# Patient Record
Sex: MALE | Race: WHITE
[De-identification: names, ages, dates, MRNs, and addresses within clinical notes are randomized per-mention and may not be internally consistent; named-entity substitution may affect disease eponyms.]

---

## 2019-07-30 ENCOUNTER — HOSPITAL ENCOUNTER (EMERGENCY)
Dept: HOSPITAL 83 - ED | Age: 84
Discharge: HOME | End: 2019-07-30
Payer: MEDICARE

## 2019-07-30 VITALS — HEIGHT: 60 IN | WEIGHT: 225 LBS

## 2019-07-30 DIAGNOSIS — T63.441A: Primary | ICD-10-CM
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
## 2019-07-30 DIAGNOSIS — Z79.899: ICD-10-CM

## 2019-07-30 DIAGNOSIS — Z79.82: ICD-10-CM

## 2020-03-20 ENCOUNTER — HOSPITAL ENCOUNTER (INPATIENT)
Dept: HOSPITAL 83 - ED | Age: 85
LOS: 2 days | Discharge: HOME | DRG: 871 | End: 2020-03-22
Attending: EMERGENCY MEDICINE | Admitting: EMERGENCY MEDICINE
Payer: MEDICARE

## 2020-03-20 VITALS — DIASTOLIC BLOOD PRESSURE: 72 MMHG | SYSTOLIC BLOOD PRESSURE: 136 MMHG

## 2020-03-20 VITALS — WEIGHT: 212.19 LBS | HEIGHT: 70 IN | BODY MASS INDEX: 30.38 KG/M2

## 2020-03-20 VITALS — DIASTOLIC BLOOD PRESSURE: 73 MMHG

## 2020-03-20 VITALS — SYSTOLIC BLOOD PRESSURE: 154 MMHG | DIASTOLIC BLOOD PRESSURE: 78 MMHG

## 2020-03-20 VITALS — DIASTOLIC BLOOD PRESSURE: 105 MMHG | SYSTOLIC BLOOD PRESSURE: 170 MMHG

## 2020-03-20 DIAGNOSIS — Z96.651: ICD-10-CM

## 2020-03-20 DIAGNOSIS — Z79.899: ICD-10-CM

## 2020-03-20 DIAGNOSIS — N17.0: ICD-10-CM

## 2020-03-20 DIAGNOSIS — I10: ICD-10-CM

## 2020-03-20 DIAGNOSIS — E86.0: ICD-10-CM

## 2020-03-20 DIAGNOSIS — I24.8: ICD-10-CM

## 2020-03-20 DIAGNOSIS — E11.65: ICD-10-CM

## 2020-03-20 DIAGNOSIS — E87.2: ICD-10-CM

## 2020-03-20 DIAGNOSIS — R65.20: ICD-10-CM

## 2020-03-20 DIAGNOSIS — Z79.82: ICD-10-CM

## 2020-03-20 DIAGNOSIS — I47.1: ICD-10-CM

## 2020-03-20 DIAGNOSIS — R74.0: ICD-10-CM

## 2020-03-20 DIAGNOSIS — D53.9: ICD-10-CM

## 2020-03-20 DIAGNOSIS — S26.90XA: ICD-10-CM

## 2020-03-20 DIAGNOSIS — R68.89: ICD-10-CM

## 2020-03-20 DIAGNOSIS — A41.9: Primary | ICD-10-CM

## 2020-03-20 DIAGNOSIS — H91.90: ICD-10-CM

## 2020-03-20 DIAGNOSIS — E44.1: ICD-10-CM

## 2020-03-20 DIAGNOSIS — E87.1: ICD-10-CM

## 2020-03-20 DIAGNOSIS — J18.9: ICD-10-CM

## 2020-03-20 LAB
ALBUMIN SERPL-MCNC: 3.9 GM/DL (ref 3.1–4.5)
ALP SERPL-CCNC: 53 U/L (ref 45–117)
ALT SERPL W P-5'-P-CCNC: 32 U/L (ref 12–78)
APPEARANCE UR: (no result)
APTT PPP: 25.2 SECONDS (ref 20–32.1)
AST SERPL-CCNC: 47 IU/L (ref 3–35)
BACTERIA #/AREA URNS HPF: (no result) /[HPF]
BASOPHILS # BLD AUTO: 0 10*3/UL (ref 0–0.1)
BASOPHILS NFR BLD AUTO: 0.1 % (ref 0–1)
BILIRUB UR QL STRIP: NEGATIVE
BUN SERPL-MCNC: 27 MG/DL (ref 7–24)
CHLORIDE SERPL-SCNC: 102 MMOL/L (ref 98–107)
COLOR UR: YELLOW
CREAT SERPL-MCNC: 1.4 MG/DL (ref 0.7–1.3)
EOSINOPHIL # BLD AUTO: 0 10*3/UL (ref 0–0.4)
EOSINOPHIL # BLD AUTO: 0.1 % (ref 1–4)
EPI CELLS #/AREA URNS HPF: (no result) /[HPF]
ERYTHROCYTE [DISTWIDTH] IN BLOOD BY AUTOMATED COUNT: 12.8 % (ref 0–14.5)
GLUCOSE UR QL: (no result)
HCT VFR BLD AUTO: 44 % (ref 42–52)
HGB BLD-MCNC: 14.8 G/DL (ref 14–18)
HGB UR QL STRIP: (no result)
INR BLD: 1 (ref 2–3.5)
KETONES UR QL STRIP: (no result)
LDH SERPL-CCNC: 303 U/L (ref 87–241)
LEUKOCYTE ESTERASE UR QL STRIP: NEGATIVE
LYMPHOCYTES # BLD AUTO: 1 10*3/UL (ref 1.3–4.4)
LYMPHOCYTES NFR BLD AUTO: 10 % (ref 27–41)
MCH RBC QN AUTO: 33.5 PG (ref 27–31)
MCHC RBC AUTO-ENTMCNC: 33.6 G/DL (ref 33–37)
MCV RBC AUTO: 99.5 FL (ref 80–94)
MONOCYTES # BLD AUTO: 0.5 10*3/UL (ref 0.1–1)
MONOCYTES NFR BLD MANUAL: 5.5 % (ref 3–9)
MUCOUS THREADS URNS QL MICRO: (no result)
NEUT #: 8 10*3/UL (ref 2.3–7.9)
NEUT %: 84 % (ref 47–73)
NITRITE UR QL STRIP: NEGATIVE
NRBC BLD QL AUTO: 0 10*3/UL (ref 0–0)
PH UR STRIP: 6 [PH] (ref 5–9)
PLATELET # BLD AUTO: 194 10*3/UL (ref 130–400)
PMV BLD AUTO: 10.6 FL (ref 9.6–12.3)
POTASSIUM SERPL-SCNC: 4.8 MMOL/L (ref 3.5–5.1)
PROT SERPL-MCNC: 7.6 GM/DL (ref 6.4–8.2)
RBC # BLD AUTO: 4.42 10*6/UL (ref 4.5–5.9)
RBC #/AREA URNS HPF: (no result) RBC/HPF (ref 0–2)
SODIUM SERPL-SCNC: 133 MMOL/L (ref 136–145)
SP GR UR: 1.02 (ref 1–1.03)
TROPONIN I SERPL-MCNC: 0.48 NG/ML (ref ?–0.04)
TSH SERPL DL<=0.005 MIU/L-ACNC: 3.9 UIU/ML (ref 0.36–4.75)
UROBILINOGEN UR STRIP-MCNC: 0.2 E.U./DL (ref 0.2–1)
WBC #/AREA URNS HPF: (no result) WBC/HPF (ref 0–5)
WBC NRBC COR # BLD AUTO: 9.6 10*3/UL (ref 4.8–10.8)

## 2020-03-21 VITALS — DIASTOLIC BLOOD PRESSURE: 56 MMHG

## 2020-03-21 VITALS — DIASTOLIC BLOOD PRESSURE: 68 MMHG | SYSTOLIC BLOOD PRESSURE: 146 MMHG

## 2020-03-21 VITALS — DIASTOLIC BLOOD PRESSURE: 78 MMHG

## 2020-03-21 VITALS — DIASTOLIC BLOOD PRESSURE: 58 MMHG

## 2020-03-21 LAB
25(OH)D3 SERPL-MCNC: 31.4 NG/ML (ref 30–100)
ALBUMIN SERPL-MCNC: 3.3 GM/DL (ref 3.1–4.5)
ALP SERPL-CCNC: 43 U/L (ref 45–117)
ALT SERPL W P-5'-P-CCNC: 25 U/L (ref 12–78)
AST SERPL-CCNC: 35 IU/L (ref 3–35)
BASOPHILS # BLD AUTO: 0 10*3/UL (ref 0–0.1)
BASOPHILS NFR BLD AUTO: 0.3 % (ref 0–1)
BUN SERPL-MCNC: 18 MG/DL (ref 7–24)
CHLORIDE SERPL-SCNC: 109 MMOL/L (ref 98–107)
CHOLEST SERPL-MCNC: 118 MG/DL (ref ?–200)
CREAT SERPL-MCNC: 0.88 MG/DL (ref 0.7–1.3)
EOSINOPHIL # BLD AUTO: 0.1 10*3/UL (ref 0–0.4)
EOSINOPHIL # BLD AUTO: 1.2 % (ref 1–4)
ERYTHROCYTE [DISTWIDTH] IN BLOOD BY AUTOMATED COUNT: 12.9 % (ref 0–14.5)
HCT VFR BLD AUTO: 37.6 % (ref 42–52)
HDLC SERPL-MCNC: 37 MG/DL (ref 40–60)
HGB BLD-MCNC: 12.5 G/DL (ref 14–18)
INR BLD: 1 (ref 2–3.5)
LDH SERPL-CCNC: 201 U/L (ref 87–241)
LDLC SERPL DIRECT ASSAY-MCNC: 56 MG/DL (ref 9–159)
LYMPHOCYTES # BLD AUTO: 2.1 10*3/UL (ref 1.3–4.4)
LYMPHOCYTES NFR BLD AUTO: 26.5 % (ref 27–41)
MCH RBC QN AUTO: 33 PG (ref 27–31)
MCHC RBC AUTO-ENTMCNC: 33.2 G/DL (ref 33–37)
MCV RBC AUTO: 99.2 FL (ref 80–94)
MONOCYTES # BLD AUTO: 0.8 10*3/UL (ref 0.1–1)
MONOCYTES NFR BLD MANUAL: 9.9 % (ref 3–9)
NEUT #: 4.8 10*3/UL (ref 2.3–7.9)
NEUT %: 61.8 % (ref 47–73)
NRBC BLD QL AUTO: 0 10*3/UL (ref 0–0)
PHOSPHATE SERPL-MCNC: 2.7 MG/DL (ref 2.5–4.9)
PLATELET # BLD AUTO: 150 10*3/UL (ref 130–400)
PMV BLD AUTO: 10.3 FL (ref 9.6–12.3)
POTASSIUM SERPL-SCNC: 3.9 MMOL/L (ref 3.5–5.1)
PROT SERPL-MCNC: 6.4 GM/DL (ref 6.4–8.2)
RBC # BLD AUTO: 3.79 10*6/UL (ref 4.5–5.9)
SODIUM SERPL-SCNC: 140 MMOL/L (ref 136–145)
TRIGL SERPL-MCNC: 126 MG/DL (ref ?–150)
TROPONIN I SERPL-MCNC: 0.26 NG/ML (ref ?–0.04)
TSH SERPL DL<=0.005 MIU/L-ACNC: 5.24 UIU/ML (ref 0.36–4.75)
VITAMIN B12: 304 PG/ML (ref 247–911)
VLDLC SERPL CALC-MCNC: 25 MG/DL (ref 6–40)
WBC NRBC COR # BLD AUTO: 7.7 10*3/UL (ref 4.8–10.8)

## 2020-03-22 VITALS — SYSTOLIC BLOOD PRESSURE: 149 MMHG | DIASTOLIC BLOOD PRESSURE: 67 MMHG

## 2020-03-22 VITALS — SYSTOLIC BLOOD PRESSURE: 162 MMHG | DIASTOLIC BLOOD PRESSURE: 77 MMHG

## 2020-05-16 ENCOUNTER — HOSPITAL ENCOUNTER (OUTPATIENT)
Dept: HOSPITAL 83 - LAB | Age: 85
Discharge: HOME | End: 2020-05-16
Payer: MEDICARE

## 2020-05-16 DIAGNOSIS — R60.9: ICD-10-CM

## 2020-05-16 DIAGNOSIS — E11.65: Primary | ICD-10-CM

## 2020-05-16 DIAGNOSIS — I51.7: ICD-10-CM

## 2020-05-16 DIAGNOSIS — R19.7: ICD-10-CM

## 2020-05-16 LAB
ALBUMIN SERPL-MCNC: 3.4 GM/DL (ref 3.1–4.5)
ALP SERPL-CCNC: 59 U/L (ref 45–117)
ALT SERPL W P-5'-P-CCNC: 58 U/L (ref 12–78)
AST SERPL-CCNC: 66 IU/L (ref 3–35)
BUN SERPL-MCNC: 27 MG/DL (ref 7–24)
CHLORIDE SERPL-SCNC: 106 MMOL/L (ref 98–107)
CREAT SERPL-MCNC: 1.13 MG/DL (ref 0.7–1.3)
POTASSIUM SERPL-SCNC: 4.2 MMOL/L (ref 3.5–5.1)
PROT SERPL-MCNC: 6.9 GM/DL (ref 6.4–8.2)
SODIUM SERPL-SCNC: 137 MMOL/L (ref 136–145)

## 2020-05-20 ENCOUNTER — HOSPITAL ENCOUNTER (OUTPATIENT)
Dept: HOSPITAL 83 - CARD | Age: 85
Discharge: HOME | End: 2020-05-20
Payer: MEDICARE

## 2020-05-20 DIAGNOSIS — I08.1: Primary | ICD-10-CM

## 2020-05-23 ENCOUNTER — HOSPITAL ENCOUNTER (EMERGENCY)
Dept: HOSPITAL 83 - ED | Age: 85
Discharge: HOME | End: 2020-05-23
Payer: MEDICARE

## 2020-05-23 VITALS — HEIGHT: 70 IN | WEIGHT: 210 LBS | BODY MASS INDEX: 30.06 KG/M2

## 2020-05-23 DIAGNOSIS — R53.83: Primary | ICD-10-CM

## 2020-05-23 DIAGNOSIS — Z79.899: ICD-10-CM

## 2020-05-23 DIAGNOSIS — M19.90: ICD-10-CM

## 2020-05-23 DIAGNOSIS — E11.9: ICD-10-CM

## 2020-05-23 DIAGNOSIS — I10: ICD-10-CM

## 2020-05-23 DIAGNOSIS — F32.9: ICD-10-CM

## 2020-05-23 DIAGNOSIS — Z79.4: ICD-10-CM

## 2020-05-23 LAB
APPEARANCE UR: CLEAR
BACTERIA #/AREA URNS HPF: (no result) /[HPF]
BASOPHILS # BLD AUTO: 0 10*3/UL (ref 0–0.1)
BASOPHILS NFR BLD AUTO: 0.2 % (ref 0–1)
BILIRUB UR QL STRIP: NEGATIVE
BUN SERPL-MCNC: 22 MG/DL (ref 7–24)
CHLORIDE SERPL-SCNC: 103 MMOL/L (ref 98–107)
COLOR UR: YELLOW
CREAT SERPL-MCNC: 1.13 MG/DL (ref 0.7–1.3)
EOSINOPHIL # BLD AUTO: 0.1 10*3/UL (ref 0–0.4)
EOSINOPHIL # BLD AUTO: 0.9 % (ref 1–4)
EPI CELLS #/AREA URNS HPF: (no result) /[HPF]
ERYTHROCYTE [DISTWIDTH] IN BLOOD BY AUTOMATED COUNT: 12.6 % (ref 0–14.5)
GLUCOSE UR QL: (no result)
HCT VFR BLD AUTO: 41.9 % (ref 42–52)
HGB UR QL STRIP: (no result)
KETONES UR QL STRIP: NEGATIVE
LEUKOCYTE ESTERASE UR QL STRIP: NEGATIVE
LYMPHOCYTES # BLD AUTO: 1.4 10*3/UL (ref 1.3–4.4)
LYMPHOCYTES NFR BLD AUTO: 17.4 % (ref 27–41)
MCH RBC QN AUTO: 33.6 PG (ref 27–31)
MCHC RBC AUTO-ENTMCNC: 34.4 G/DL (ref 33–37)
MCV RBC AUTO: 97.7 FL (ref 80–94)
MONOCYTES # BLD AUTO: 0.6 10*3/UL (ref 0.1–1)
MONOCYTES NFR BLD MANUAL: 7.5 % (ref 3–9)
NEUT #: 6.1 10*3/UL (ref 2.3–7.9)
NEUT %: 73.8 % (ref 47–73)
NITRITE UR QL STRIP: NEGATIVE
NRBC BLD QL AUTO: 0 % (ref 0–0)
PH UR STRIP: 5 [PH] (ref 5–9)
PLATELET # BLD AUTO: 164 10*3/UL (ref 130–400)
PMV BLD AUTO: 9.9 FL (ref 9.6–12.3)
POTASSIUM SERPL-SCNC: 3.8 MMOL/L (ref 3.5–5.1)
RBC # BLD AUTO: 4.29 10*6/UL (ref 4.5–5.9)
RBC #/AREA URNS HPF: (no result) RBC/HPF (ref 0–2)
SODIUM SERPL-SCNC: 134 MMOL/L (ref 136–145)
SP GR UR: 1.02 (ref 1–1.03)
UROBILINOGEN UR STRIP-MCNC: 0.2 E.U./DL (ref 0.2–1)
WBC #/AREA URNS HPF: (no result) WBC/HPF (ref 0–5)
WBC NRBC COR # BLD AUTO: 8.2 10*3/UL (ref 4.8–10.8)

## 2020-05-24 NOTE — NUR
CLIENT UNABLE TO REMEMBER WHO I WAS. HE HAS KNOWN ME AND WATCHED ME GROW UP
SINCE I WAS 5YRS OLD. TRIED TO REMIND HIM THAT HIS DAUGHTER MICHAEL AND I WERE
BEST FRIENDS.
PROCCUPIED WITH IS THROAT LOZENGES THAT WAS IN HIS POCKET. REFUSED TO COME
DOWN TO DININGROOM TO EAT. SHUTS DOWN CONVERSATION BY TURNING OVER AND
COVERING HEAD.

## 2020-05-25 ENCOUNTER — HOSPITAL ENCOUNTER (INPATIENT)
Dept: HOSPITAL 83 - ED | Age: 85
LOS: 9 days | Discharge: HOME | DRG: 885 | End: 2020-06-03
Attending: PSYCHIATRY & NEUROLOGY | Admitting: PSYCHIATRY & NEUROLOGY
Payer: MEDICARE

## 2020-05-25 VITALS — BODY MASS INDEX: 30.06 KG/M2 | HEIGHT: 70 IN | WEIGHT: 210 LBS

## 2020-05-25 VITALS — DIASTOLIC BLOOD PRESSURE: 85 MMHG | SYSTOLIC BLOOD PRESSURE: 132 MMHG

## 2020-05-25 VITALS — DIASTOLIC BLOOD PRESSURE: 85 MMHG | SYSTOLIC BLOOD PRESSURE: 135 MMHG

## 2020-05-25 VITALS — SYSTOLIC BLOOD PRESSURE: 159 MMHG | DIASTOLIC BLOOD PRESSURE: 79 MMHG

## 2020-05-25 VITALS — DIASTOLIC BLOOD PRESSURE: 85 MMHG

## 2020-05-25 DIAGNOSIS — K21.9: ICD-10-CM

## 2020-05-25 DIAGNOSIS — I10: ICD-10-CM

## 2020-05-25 DIAGNOSIS — R81: ICD-10-CM

## 2020-05-25 DIAGNOSIS — E83.41: ICD-10-CM

## 2020-05-25 DIAGNOSIS — Z79.4: ICD-10-CM

## 2020-05-25 DIAGNOSIS — M19.90: ICD-10-CM

## 2020-05-25 DIAGNOSIS — E86.0: ICD-10-CM

## 2020-05-25 DIAGNOSIS — E78.5: ICD-10-CM

## 2020-05-25 DIAGNOSIS — E87.1: ICD-10-CM

## 2020-05-25 DIAGNOSIS — E80.6: ICD-10-CM

## 2020-05-25 DIAGNOSIS — H91.13: ICD-10-CM

## 2020-05-25 DIAGNOSIS — E44.1: ICD-10-CM

## 2020-05-25 DIAGNOSIS — F33.2: Primary | ICD-10-CM

## 2020-05-25 DIAGNOSIS — G30.9: ICD-10-CM

## 2020-05-25 DIAGNOSIS — E11.65: ICD-10-CM

## 2020-05-25 DIAGNOSIS — Z79.899: ICD-10-CM

## 2020-05-25 DIAGNOSIS — F02.80: ICD-10-CM

## 2020-05-25 LAB
ALBUMIN SERPL-MCNC: 3.8 GM/DL (ref 3.1–4.5)
ALP SERPL-CCNC: 59 U/L (ref 45–117)
ALT SERPL W P-5'-P-CCNC: 66 U/L (ref 12–78)
APPEARANCE UR: CLEAR
APTT PPP: 24.1 SECONDS (ref 20–32.1)
AST SERPL-CCNC: 90 IU/L (ref 3–35)
BASOPHILS # BLD AUTO: 0 10*3/UL (ref 0–0.1)
BASOPHILS NFR BLD AUTO: 0.2 % (ref 0–1)
BILIRUB UR QL STRIP: NEGATIVE
BUN SERPL-MCNC: 27 MG/DL (ref 7–24)
CHLORIDE SERPL-SCNC: 101 MMOL/L (ref 98–107)
COLOR UR: YELLOW
CREAT SERPL-MCNC: 1.17 MG/DL (ref 0.7–1.3)
EOSINOPHIL # BLD AUTO: 0.1 10*3/UL (ref 0–0.4)
EOSINOPHIL # BLD AUTO: 0.6 % (ref 1–4)
EPI CELLS #/AREA URNS HPF: (no result) /[HPF]
ERYTHROCYTE [DISTWIDTH] IN BLOOD BY AUTOMATED COUNT: 12.6 % (ref 0–14.5)
GLUCOSE UR QL: (no result)
HCT VFR BLD AUTO: 43.6 % (ref 42–52)
HGB UR QL STRIP: NEGATIVE
INR BLD: 1 (ref 2–3.5)
KETONES UR QL STRIP: NEGATIVE
LEUKOCYTE ESTERASE UR QL STRIP: NEGATIVE
LIPASE SERPL-CCNC: 129 U/L (ref 73–393)
LYMPHOCYTES # BLD AUTO: 1.7 10*3/UL (ref 1.3–4.4)
LYMPHOCYTES NFR BLD AUTO: 18.4 % (ref 27–41)
MCH RBC QN AUTO: 33.3 PG (ref 27–31)
MCHC RBC AUTO-ENTMCNC: 34.2 G/DL (ref 33–37)
MCV RBC AUTO: 97.3 FL (ref 80–94)
MONOCYTES # BLD AUTO: 0.8 10*3/UL (ref 0.1–1)
MONOCYTES NFR BLD MANUAL: 8.3 % (ref 3–9)
NEUT #: 6.7 10*3/UL (ref 2.3–7.9)
NEUT %: 72.2 % (ref 47–73)
NITRITE UR QL STRIP: NEGATIVE
NRBC BLD QL AUTO: 0 % (ref 0–0)
PH UR STRIP: 6.5 [PH] (ref 5–9)
PLATELET # BLD AUTO: 177 10*3/UL (ref 130–400)
PMV BLD AUTO: 9.9 FL (ref 9.6–12.3)
POTASSIUM SERPL-SCNC: 4.1 MMOL/L (ref 3.5–5.1)
PROT SERPL-MCNC: 7.5 GM/DL (ref 6.4–8.2)
RBC # BLD AUTO: 4.48 10*6/UL (ref 4.5–5.9)
SODIUM SERPL-SCNC: 135 MMOL/L (ref 136–145)
SP GR UR: 1.01 (ref 1–1.03)
TROPONIN I SERPL-MCNC: < 0.015 NG/ML (ref ?–0.04)
UROBILINOGEN UR STRIP-MCNC: 0.2 E.U./DL (ref 0.2–1)
WBC #/AREA URNS HPF: (no result) WBC/HPF (ref 0–5)
WBC NRBC COR # BLD AUTO: 9.2 10*3/UL (ref 4.8–10.8)

## 2020-05-25 NOTE — NUR
LORRAINE CHEUNG a 87 year old M admitted via
wheel chair from the ADMITTING as a emergency 72 hr. hold admission.
Arrived on unit at 1717.
ALLERGIES: NKA.
Vital signs are: 97.7-98-18 132/85.
The client signed the following forms with stated understanding: Authorization
For The Release of Medical Information, Clothing List, Consent to Voluntary
Admission and Hospitalization, Consent and Release Forms/Receipt of Rights,
Acknowledgement of Advance Directive Information, Behavioral Health Consent
Form, and Informed Consent of Medications. Admitted under the services of Dr. ROCHELLE MITCHELL,[facility]. A search was conducted and hazardous articles were removed.
Client was oriented to the unit.
EDNA ARNETT

## 2020-05-25 NOTE — NUR
SPOKE WITH DR SALAZAR AND ADVISED OF MEDICAL MANAGEMENT CONSULT NEEDED PER DR
PLACE CONSULT UNDER DR CROWE. NO FURTHER ORDERS AT THIS TIME.

## 2020-05-25 NOTE — NUR
CLIENT UNABLE TO RECOGNIZE ME EVEN THOUGH HE HAS KNOWN ME SINCE I WAS 5 YRS
OLD. I WAS BEST FRIENDS WITH HIS DAUGHTER MICHAEL AND WE LIVED AT EACH OTHERS
HOMES. TRIED TO REMIND HIM BUT UNSUCCESSFUL. MORE ORIENTED TO PRESENT LIKE HE
WAS SUPPOSE TO BE IN THE [employer] DAY PARADE REPRESENTING THE CIVIL
WAR. STATES HE HAS NO WILL TO LIVE, HE JUST WANTS TO LAY IN BED AND DIE. SAYS
HE HAS NOTHING LEFT. DISCUSSED HIS GRANDCHILDREN AND CHILDREN BUT NO CHANGE IN
HIS THOUGHT PROCESS. REFUSED TO COME DOWN TO DININGROOM FOR SNACK. HE DOES
SHUT DOWN WHEN HE GETS FRUSTRATED. HE WILL TURN AWAY COVER HEAD AND START TO
CRY. WHEN I TRIED TO COMFORT HIM HE REPLIED "THANKS FOR THE TALK GO AWAY".
REFUSED PM CARE

## 2020-05-26 VITALS — DIASTOLIC BLOOD PRESSURE: 65 MMHG

## 2020-05-26 VITALS — DIASTOLIC BLOOD PRESSURE: 66 MMHG | SYSTOLIC BLOOD PRESSURE: 131 MMHG

## 2020-05-26 LAB
25(OH)D3 SERPL-MCNC: 38.7 NG/ML (ref 30–100)
ALBUMIN SERPL-MCNC: 3.9 GM/DL (ref 3.1–4.5)
ALP SERPL-CCNC: 58 U/L (ref 45–117)
ALT SERPL W P-5'-P-CCNC: 64 U/L (ref 12–78)
AST SERPL-CCNC: 91 IU/L (ref 3–35)
BASOPHILS # BLD AUTO: 0 10*3/UL (ref 0–0.1)
BASOPHILS NFR BLD AUTO: 0.3 % (ref 0–1)
BUN SERPL-MCNC: 23 MG/DL (ref 7–24)
CHLORIDE SERPL-SCNC: 101 MMOL/L (ref 98–107)
CHOLEST SERPL-MCNC: 153 MG/DL (ref ?–200)
CREAT SERPL-MCNC: 1.17 MG/DL (ref 0.7–1.3)
EOSINOPHIL # BLD AUTO: 0.1 10*3/UL (ref 0–0.4)
EOSINOPHIL # BLD AUTO: 1.3 % (ref 1–4)
ERYTHROCYTE [DISTWIDTH] IN BLOOD BY AUTOMATED COUNT: 12.7 % (ref 0–14.5)
HCT VFR BLD AUTO: 47.4 % (ref 42–52)
HDLC SERPL-MCNC: 50 MG/DL (ref 40–60)
LDLC SERPL DIRECT ASSAY-MCNC: 78 MG/DL (ref 9–159)
LYMPHOCYTES # BLD AUTO: 3 10*3/UL (ref 1.3–4.4)
LYMPHOCYTES NFR BLD AUTO: 33.1 % (ref 27–41)
MCH RBC QN AUTO: 33.2 PG (ref 27–31)
MCHC RBC AUTO-ENTMCNC: 33.8 G/DL (ref 33–37)
MCV RBC AUTO: 98.3 FL (ref 80–94)
MONOCYTES # BLD AUTO: 0.7 10*3/UL (ref 0.1–1)
MONOCYTES NFR BLD MANUAL: 8 % (ref 3–9)
NEUT #: 5.1 10*3/UL (ref 2.3–7.9)
NEUT %: 57.1 % (ref 47–73)
NRBC BLD QL AUTO: 0 10*3/UL (ref 0–0)
PLATELET # BLD AUTO: 197 10*3/UL (ref 130–400)
PMV BLD AUTO: 10.3 FL (ref 9.6–12.3)
POTASSIUM SERPL-SCNC: 4.3 MMOL/L (ref 3.5–5.1)
PROT SERPL-MCNC: 7.7 GM/DL (ref 6.4–8.2)
RBC # BLD AUTO: 4.82 10*6/UL (ref 4.5–5.9)
SODIUM SERPL-SCNC: 134 MMOL/L (ref 136–145)
TRIGL SERPL-MCNC: 126 MG/DL (ref ?–150)
TSH SERPL DL<=0.005 MIU/L-ACNC: 5.21 UIU/ML (ref 0.36–4.75)
VITAMIN B12: 502 PG/ML (ref 247–911)
VLDLC SERPL CALC-MCNC: 25 MG/DL (ref 6–40)
WBC NRBC COR # BLD AUTO: 9 10*3/UL (ref 4.8–10.8)

## 2020-05-26 NOTE — NUR
IP 10 days jose f per Loretta at [facility], LCD/NRD 6/3. Ref # [id number]

## 2020-05-26 NOTE — NUR
Occupational therapy orders and nursing screen received. Will follow up with
patient for completion of an OT evaluation. Thank you.
 
Rebeca Carnes, OTR/L

## 2020-05-26 NOTE — NUR
PHYSICAL THERAPY
 
Physical Therapy evaluation completed on 3N with full evaluation to follow.
Low complexity PT evaluation per chart review and evaluation, 40134.
Recommend physical therapy per plan of care and Home Health upon discharge.
Thank you for this referral. Abril Oliva,PT,DPT

## 2020-05-26 NOTE — NUR
P: depressed state, states he just wants to lay down and die, "I just cant get
moving"
 
I: Spoke with pt about interaction and ability to get moving increases energy
and ability to move will help. Also spoke with pt about things he enjoys, due
to [place] he has a hard time communicating and will be come frustrated, attempted
to type with pt which he was not receptive to.
 
R: Pt encouraged to get up for breakfast, came to lounge, then got shower
shorlty after consuming meal. Smiling when spoken to continues to isolate with
himself.
 
P: continue to promote pt ambulation, and activities with group surroundings.
no HI. SI statements are passive with no plans, medication compliant this
shift. eating without good appetite.

## 2020-05-26 NOTE — NUR
Attempted to call patient daughter Franchesca to discuss discharge planning. Unable
to leave a message.

## 2020-05-26 NOTE — NUR
YELLING OUT "I CAN'T SLEEP I can't do this" WENT TO TALK WITH HIM BUT UNABLE
 TO REDIRECT THAT HE HAS SLEPT, TOLD ME HE DIDN'T SLEEP, CAN'T PEE BECAUSE HE
 HASN'T HAD HIS LOZENGES, FOOD OR WATER. PROVIDED A CUP OF WATER FROM HIS
 DRESSER. INFORMED HIM WE WILL ASK DOCTOR FOR LOZENGES, AND THAT HE WAS
 ENCOURAGED TO COME TO DININGROOM TO EAT BUT HE REFUSED. YELLED NO I'M NOT
 GOING THERE. TURNED OVER AND COVERED HEAD. REFUSED TO SPEAK. WILL CONTINUE TO
 MONITOR.

## 2020-05-26 NOTE — NUR
Spoke with Patient Daughter Franchesca via telephone. Franchesca states that she would
like for Pt. to return home at discharge and that he may need some home
health. Franchesca states that patient has been struggling with being at home
during the virus. Pt. Lives alone and still drives. Franchesca states patient may
benefit from Home Health at discharge.

## 2020-05-26 NOTE — NUR
Spoke with patient in Quietroom area. Pt. states he is "Feeling Hopeless since
they changed his Insulin Around at home he is struggling to control his blood
sugars". "Feeling overwhelmed due to not being able to do things he normally
did before that Virus". Pt. is very Hard of Hearing. Pt. Lives at Home alone
still drives. His daughter Franchesca Lives in [place] and he talks to he
every day. Pt. states he has "Been having issues with his bowels and feeling
sad due to not being able to go to [employer]" "I miss my wife". Pt. tearful.
Reassurance Provided. Will Follow.

## 2020-05-26 NOTE — NUR
PT COMPLAINT THAT BATTERY IN LEFT HEARING AID IS GOING DEAD, ATTEMPTED TO
REPLACE WITH PT SIZE 13 IN STORAGE, THIS IS NOT THE PROPER SIZE. DAUGHTER
NOTFIED AND WILL ATTEMPT TO FIND PACKAGES SHE JUST BOUGHT.

## 2020-05-26 NOTE — NUR
Treatment Plan meeting was held via telephone with Dr. Cuenca, PERCY Wilkes RN,
and Discharge Planner. Plan for discharge Next week. Will follow with family
for discharge planning.

## 2020-05-26 NOTE — NUR
Spoke to Nancy at [facility] regarding precertification for inpatient behavioral
health. Information given. Awaiting return call.
Pending auth # [id number].

## 2020-05-26 NOTE — NUR
CAME DOWN FOR SNACK. APOLOGIZED FOR HIS NEGATIVE LANGUAGE YESTERDAY BUT HE IS
ONLY TELLING THE TRUTH. CONTINUES TO BLAME CHANGE IN INSULIN THAT CAUSED HIS
SUGAR TO GO UP AND WEIGHT GAIN. THE VIRUS HAS CAUSED HIS DESIRE TO NOT LIVE
ANYMORE AND TO JOIN HIS WIFE. DENIES ANY THOUGHTS OF HURTING SELF BUT JUST
WANTS TO GO TO SLEEP AND NOT WAKE UP. EMOTIONAL SUPPORT PROVIDED

## 2020-05-26 NOTE — NUR
Occupational Therapy evaluation completed on 3N with full evaluation to
follow.  Recommend occupational therapy per plan of care and return home upon
discharge.  Thank you for this referral.
Michelle Che OTR/L

## 2020-05-27 VITALS — SYSTOLIC BLOOD PRESSURE: 121 MMHG | DIASTOLIC BLOOD PRESSURE: 69 MMHG

## 2020-05-27 VITALS — DIASTOLIC BLOOD PRESSURE: 81 MMHG

## 2020-05-27 NOTE — NUR
P-DEPRESSED MOOD, ISOLATIVE
 
I-REDIRECTION WITH 1:1 THEAPEUTIC INTERVENTIONS AND COMMUNICATION. EDUCATE AND
ENCOURAGE MEDICATION COMPLIANCE
 
R-MEDICATION NONCOMPLIANT AT HS. PATIENT REFUSED NOURISHMENT BUT PROVIDED
FLUIDS AT HS.  PATIENT WITH DEPRESSED MOOD AT HS. PATIENT STATING "I JUST
DON'T WANT ANYTHING FROM ANYBODY RIGHT NOW". PATIENT REQUESTING THAT HIS
DAUGHTER BE CALLED TO PUT UP HIS OLD CIVIL WAR CLAIRE. DAUGHTER CLAIR UPADATED
AND AWARE OF PATIENT REQUEST.  PATIENT WITH NO HALLUCINATIONS OR DELUSIONS.
PATIENT WITH NO HOMICIDAL IDEATIONS AND DENIES SUICIDAL IDEATIONS AT THIS
TIME. PATIENT HARD OF HEARING. PATIENT COMPLIED WITH TAKING REMERON AT HS.
 
P-CONTINUE TO ENCOURAGE MEDICATION COMPLIANCE, ENCOURAGE GROUP THERAPY WHILE
AWAKE

## 2020-05-27 NOTE — NUR
PHYSICAL THERAPY
 
Patient seen this am for therapy visit and was just awakening supine in bed
upon therapist arrival. Patient identified by name /  and was mostly
pleasant this morning voicing no new c/o's. OT assistant was also present for
observation only this session as patient is Independent with all transfers.
Patient ambulates without AD, ad jamie in room to bathroom, distant Supervision,
then additional 150'x 1, demonstrating slow, steady brett. Patient tolerated
eyes open / closed without LOB, single leg stance, R side 1 second, L side 2
seconds prior to LOB. Patient returned to Quiet room awaiting breakfast, under
[facility] staff Supervision. Will continue per POC as tolerated, total treatment
time 17 minutes.   Erasto Matthew, PTA

## 2020-05-27 NOTE — NUR
P: PT HAS PASSIVE NEGATIVE STATEMENTS OF WANTING TO "JUST DIE", GUILTY DUE TO
CAUSING FINANCIAL RUIN TO HIS DAUGHTERS, HOPELESS HELPLESS.
REFUSED LUNCH
 
I&R: PT BLOOD SUGAR WAS 66, TAKEN AN ENSURE AND ENCOURAGED TO DRINK, INFORMED
HIM THAT BY LAYING THERE ODING NOTHING WAS NOTHELPING HIM TO FEEL BETTER. pT
STATED HE WANTED TO JUST GO TO SLEEP AND NOT WAKE UP SO HIS DAUGHTERS COULD
HVE WHATEVER MONEY THEY COULD FROM HIM. PT WOULD NOT SIT UP IN BED CONTINUED
TO PULL BLANKET OVER HIS REQUESTING TO BE LEFT ALONE. PT DID DRINK THE ENSURE.
 
P: CONTINUE TO ENCOURAGE INTERACTION THROUGH OUT, ATTEMPT TO COMMUNICATE
CLEARLY WITH PT. eVEN THOUGH HE IS EXTREMELY [place]. EVEN WITH CLEAN HEARING
AIDES AND NEW BATTERIES. PT MEDICATION COMPLIANT, WILL CONTINUE TO MONITOR AND
CONTRACT FOR SAFETY

## 2020-05-27 NOTE — NUR
Treatment Plan meeting was held via telephone with Dr. Cuenca, PERCY Wilkes RN,
LISW-S and Discharge Planner. Plan for discharge Friday/Next Week.

## 2020-05-27 NOTE — NUR
DR. LANG MADE AWARE OF PATIENT'S BLOOD SUGARS TODAY AND HUMULIN 70-30 DUE
AT THIS TIME. DR. LANG STATES TO HOLD AT THIS TIME.

## 2020-05-27 NOTE — NUR
PT REFUSED 1800 MEDS, STATES "NO. I DONT WANT ANYTHING". ATTEMPTS TO EDUCATE
INEFFECTIVE. PT OFFERS NO FURTHER EXPLANATION AT THIS TIME.

## 2020-05-27 NOTE — NUR
OT NOTE
Pt was seen this A.M. 1:1 for 20 minute OT session with PTA and nursing staff
present for observation only. Upon arrival pt was supine in bed. Pt identified
by name and  and had no complaints at this time. Pt transferred supine to
sit EOB I. While sitting EOB pt donned B socks and adjusted his gown while
standing I. Sit to stand completed from bed level followed by functional
mobility to the bathroom I where he completed all toileting tasks and sink
side grooming I. Challenged pt's dynamic standing balance while weight
shifting, crossing midline, and reaching over all planes and pt was able to
maintain G- standing balance throughout. Pt tolerated aprox 15 minutes of
activity before sitting for a seated rest break. Pt was left sitting upright
in the quiet room at the table for breakfast under [facility] staff supervision.
Continue with rec D/C plan to return home.
 
TERESA Fisher/ELMER

## 2020-05-28 VITALS — DIASTOLIC BLOOD PRESSURE: 73 MMHG

## 2020-05-28 VITALS — DIASTOLIC BLOOD PRESSURE: 86 MMHG

## 2020-05-28 NOTE — NUR
THIS NURSE TALKED TO DAUGHTER, CLAIR AT . PATIENT'S DAUGHTER CLAIR INQUIRING
ABOUT HER FATHER. THIS NURSE UPDATED PATIENT'S DAUGHTER ABOUT PATIENTS POOR PO
AND SAD AFFECT AND ISOLATING SELF. PATIENT'S DAUGHTER REVIEWED MEDICATIONS
WITH THIS NURSE. PATIENT'S DAUGHTER STATED "MY FATHER IS NOT SUPPOSED TO BE ON
THAT ANTIBIOTIC. HE WAS SICK AND HAD PNEUMONIA AROUND MID MARCH. HE WAS
SUPPOSED TO STOP THAT ANITIBIOTIC ALONG TIME AGO. PATIENT'S DAUGHTER ALSO
STATED "HE HAD AN ECHO DONE LAST WEEK AND A CARDIOLOGIST WAS RECOMMENDED

## 2020-05-28 NOTE — NUR
DR LOOMIS UPDATED ABOUT BLOOD SUGAR RESULTS THIS AM. DR LOOMIS WITH ORDER TO
HOLD 70/30 55 UNITS THIS AM. DR LOOMIS TO LOOK INTO ORDER AND SEE ABOUT
CONTINUING WITH JUST SLIDING SCALE UNTIL APPETITE IMPROVES. DR LOOMIS UPDATED
ABOUT ANTIBIOTIC AND INFORMED THAT THIS NURSE SPOKE WITH DAUGHTER CLAIR AND
PATIENT WAS SUPPOSE TO COMPLETE MEDICATION IN MARCH. DR LOOMIS TO REVIEW AND
UPDATE MEDICAL DOCTORS IN AM TO LOOK INTO ANTIOBIOTIC THERAPY USE

## 2020-05-28 NOTE — NUR
Met with pt individually this AM. Pt was pleasant and talkative. Pt shared
that "things went downhill" when he was notified by [employer] that pt's
insulin was being changed to a less expensive insulin. Pt stated that he began
to worry when he saw his blood sugars climbing with the new insulin. Pt also
shared that isolating has been very difficult for him. Pt admits to being
depressed and now very concerned about his finances. Pt stated that he "has
been dumb" with his money and now claims that he has high debt and a house
that is in need of repairs. Pt further claims that he cannot return to his
house because of an issue with water in his basement. Pt remarked numerous
times that he "has been a dummy" for not addressing repair issues for his
house and spending money on luxuries such as [place] [employer] football tickets and
Civil War memorabilia. Pt also did speak about the leonidas that he has in
participating in Civil War reenactments and participating in parades. Pt also
spoke of his love for football and basketball. This writer observed that pt
did have difficulty finding some words during conversation and recalling some
recent memories. Pt gave permission for this writer to speak to pt's daughter
Franchesca who pt states has some awareness of pt's finances. Pt is voicing that he
is overwhelmed and is feeling hopeless. Pt was tearful at times throughout
coversation but was also able to verbalize about joys in his life. Pt admits
to feeling depressed and states that he is unable to experience a restful
sleep due to financial stressors.

## 2020-05-28 NOTE — NUR
Spoke with pt's daughter Franchesca and conveyed to her the concerns that pt had
shared with this writer. Franchesca stated that she is not aware of a major concern
with water in pt's basement. The drain did recently overflow and Franchesca has a
call out to someone to look at it. Franchesca also stated that she has recently
taken over paying pt's bills. From what she has seen there should be no
significant concerns with pt's finances unless she hasn't discovered it yet.
Per Franchesca, she sees no reason why pt could not return to his own home.

## 2020-05-28 NOTE — NUR
P- Depressed mood, feeling overwhelmed, poor PO intake and ADL maintenance,
medication noncompliance
I- Orientation, mood and behaviors assessed. Assessed pt for SI/HI,
hallucinations, paranoia and/or delusions. Medications administered as per
physician's orders, med compliance encouraged. Assistance with ADL care
provided as needed. Encouraged pt to attend and participate in olivera milieu
groups and activities.
R- Pt is alert and oriented x4. Memory appears to be intact. Resps easy and
even on room air. Pt is very [place] but is able to communicate effectively. Upon
this RN's first interaction with this pt this AM pt's mood presents as
overwhelmingly depressed and anxious. Affect flat. Pt guarded, appeared very
overwhelmed and reluctant to converse with staff. Pt refused to take AM
medications. This RN spent lengthy 1:1 with pt. Pt slowly became more
interactive with this RN. Pt states a large portion of his depression and
anxiety is stemming from the fact that pt states he neglected fixing water
leaking into his basement for 40 years and now it is so bad that he is unable
to return to his home. Pt states "I didn't do what I should've done all these
years and now it's become my daugther's burden. I didn't handle my finances
the way I should've and now it'll put my daughter in financial ruin. She's
supposed to be retiring soon and she'll never financially recover from this
and it's my fault". Empathized with pt and encouraged pt that everyone makes
mistakes and all we can do now is focus on how we can make things better. At
first pt disagreed and ruminated again and again over the same topics. Pt then
began to speak about the changes to his insulins that were done which per pt
report is "what started this whole thing". Discussed this further. Discussed
with pt the fact that we are not able to currently give him his ordered
insulin since he isn't eating and drinking. Pt stated he wasn't eating because
he wasn't able to move his bowels. This RN assured pt if he needed something
to help him move his bowels we could help with that. Pt agreeable to taking
stool softener or laxative. Pt also c/o dry mouth and allergies. Assured pt we
could ask the doctor about getting him allergy medication, pt states "no. I
don't want it right now". Pt discussed with this RN his recent lack of
motivation to complete ADLs, poor appetite, poor sleep and difficulty problem
solving. Pt motioned to his rice and states "I never look this bad". Pt spoke
of being upset that his whole routine being disrupted due to COVID-19.
Empathized with pt. Provided education to pt regarding symptoms of depression
and encouraged pt to give medications a chance to help relieve some of these
symptoms. Educated pt that  had added a new medication for tonight to
help increase the effectiveness of his antidepressant and help him sleep
better. Pt expressed understanding and agrees to try these medications. Pt
agreeable to sign voluntary admission at this time for further treatment.
Encouraged pt that we can assist him with showering and shaving beard when he
is ready, offered to pt that this might make him feel better. Pt states "yeah,
it might". Shortly after this RN left pt's room pt came to nurse's station
and reported he no longer needed medication to help move his bowels and was
looking forward to eating lunch. Pt states "You made me want to do it, I want
to get better".  Pt ate lunch and dinner. Pt has been markedly less isolative,
interacting with staff and peers. Took 1800 medication. No distress noted.
P- Plan to continue current treatment, continue to monitor mood and behaviors,
provide appropriate reorientation, redirection and 1:1 as needed. Continue to
encourage medication compliance as well as group attendance and participation.

## 2020-05-28 NOTE — NUR
Patient slept approx. 7.5 hours throughout shift. Q 15 minute safety checks
continued and maintained.

## 2020-05-28 NOTE — NUR
Treatment Plan meeting was held via telephone with Dr. Cuenca RN, LISW-S and
Discharge Planner. Plan for discharge Next week. Pt. will return home.

## 2020-05-28 NOTE — NUR
OT NOTE
Prior to coming to the floor spoke with nurse Warner and reported that therapy
was coming to the floor to treat this pt. Attempted to see pt this A.M. for OT
session and upon arrival pt was supine in bed. Pt was easily aroused to verbal
stimuli. When questioned how he was doing today pt gave a thumbs down motion.
Pt only further explained as "I have not ate since yesterday, I have not taken
any of my medicine, and I honestly just don't feel up to therapy or anything
at all." Pt easily engaged in conversation about his interest however
continued to decline therapy at this time. Pt was left supine in bed and
notified U staff. Continue with POC as able.
 
TERESA Fisher/ELMER

## 2020-05-28 NOTE — NUR
PHYSICAL THERAPY
 
Patient was still in bed this am when approached several times for therapy
visit and declined treatment, stating he did not feel like getting up yet.
Patient also reported not sleeping well last night and requested to be left
alone at this time. Nursing notified of patient refusal this date. Will
continue per POC as able. OT assistant was also present for observation only
this morning.        Erasto Matthew, PTA

## 2020-05-28 NOTE — NUR
Extensive time spent with pt this afternoon. Pt sought out this writer and
stated that it helped him to talk earlier and was wanting to talk further.
Spent time with pt discussing his concerns about his house and assisted pt in
problem solving. Pt spoke in depth about the activities that pt is involved in
with the Civil War. Pt shared his knowledge of Civi War history. Pt spoke of
traveling to Civil War activites within [place] and in other states. He
reminisced about traveling with his daughters and grandchildren to different
was sites and museums. Pt's mood appears to be improving. He smiles
appropriately during conversation. He continues to admit to depression. He
demonstrates insight as he states that he believes that he has spent too much
time alone "due to the virus."

## 2020-05-29 VITALS — SYSTOLIC BLOOD PRESSURE: 148 MMHG | DIASTOLIC BLOOD PRESSURE: 86 MMHG

## 2020-05-29 VITALS — DIASTOLIC BLOOD PRESSURE: 82 MMHG

## 2020-05-29 NOTE — NUR
Pt was evaluated on May 26, 2020 for Occupational therapy services. Pt has
progressed well with OT treatment at this time. He is (I) with ADLs, transfers
and functional mobility. Pt to be d/c from OT services at this time due to his
independent functional status. Recommend pt return home at d/c. Thank you for
this referral.
Michelle Che OTR/ELMER

## 2020-05-29 NOTE — NUR
PATIENT ALERT AND ORIENTED. PATIENT WITH NO SHORT TERM OR LONG TERM MEMORY
DEFICITS. PATIENT WITH NO RESPIRATORY DISTRESS. PATIENT MEDICATION COMPLIANT
AT HS. PATIENT WITH NO HALLUCINATIONS OR DELUSIONS.  PATIENT WITH NO HOMICIDAL
IDEATIONS AND DENIES SUICIDAL IDEATIONS. PATIENT IN HALLWAY AND ROOM
INTERACTING WITH PEERS AND NURSING STAFF THIS SHIFT.  CONTINUE TO ENCOURAGE
MEDICATION COMPLIANCE, ENCOURAGE GROUP THERAPY WHILE AWAKE

## 2020-05-29 NOTE — NUR
OCCUPATIONAL THERAPY CO-SIGN
 
I approve of the Occupational Therapy notes written above.
Michelle Che OTR/L

## 2020-05-29 NOTE — NUR
Patient in dining room eating breakfast with peers.
Respirations easy and regular.
Vital signs stable. No overt distress.
 
Telehealth assessment by GHANSHYAM Vargas. Updates provided.
 
RINA FIELDS

## 2020-05-29 NOTE — NUR
PHYSICAL THERAPY
 
PT treatment complete. Total treatment time: 10 minutes. Patient independent
in room with bed mobility and gait. Patient gait trained into hallway 40'x1,
participated in TUG = 13 seconds without AD, no LOB noted.  Patient gait
trained 150' in hallway with no LOB and no assist. Patient gait trained to
quiet room to eat breakfast. Patient remained in quiet room at end of PT
session. Patient has met all goals. Recommend return home at discharge. Thank
you. Abril Oliva,PT,DPT.

## 2020-05-29 NOTE — NUR
PHYSICAL THERAPY
 
Patient has progressed well with skilled PT services. Patient is independent
in room and throughout BHU with gait. Patient has met all goals, with TUG =13
seconds, and has no PT needs at this time. Discharge from PT this date.
Recommend return home at discharge. Thank you. Abril Oliva, PT, DPT.

## 2020-05-29 NOTE — NUR
Treatment plan meeting was held via telephone with PERCY Wilkes RN, LISW-S and
Discharge Planner. Plan for discharge was tentative for Next Week. NP is going
to ask Dr. Cuenca if Pt. can possibly discharge today. Pt. is doing Better and
Mood has improved.

## 2020-05-29 NOTE — NUR
Patient slept approx. 6.5 hours throughout shift. Q 15 minute safety checks
continued and maintained.

## 2020-05-29 NOTE — NUR
OT NOTE
Prior to coming to the floor spoke with nurse Warner and reported that therapy
was coming to the floor to treat this pt. Pt was seen this A.M. 1:1 for 18
minute OT session with PT and nursing staff present for observation only. Upon
arrival pt was supine in bed. Pt identified by name and  and had no
complaints at this time, pt reported "I am feeling much better today." Pt
transferred supine to sit EOB I followed by donning B socks MI while seated
EOB. Sit to stand completed from bed level followed by functional mobility
throughout his room I while gathering supplies from various heights while
maintaining G standing balance. Pt then completed toileting task, clothing
management, and sink side grooming at I level. Functional mobility then
completed to the quiet room for breakfast where he was left under [facility] staff
supervision. Continue with rec D/C plan to return to home.
 
TERESA Fisher/ELMER

## 2020-05-29 NOTE — NUR
PHYSICAL THERAPY CO-SIGN
 
 
I approve of the Physical Therapy notes written above.
 
                                FAMILIA EDMOND PT, DPT

## 2020-05-30 VITALS — DIASTOLIC BLOOD PRESSURE: 85 MMHG

## 2020-05-30 VITALS — DIASTOLIC BLOOD PRESSURE: 78 MMHG

## 2020-05-30 NOTE — NUR
PATIENT SLEPT 6-7 HOURS OF UNINTERRUPTED SLEEP THROUGHOUT SHIFT. Q 15 MINUTE
CHECKS MAINTAINED. 24 HR chart check completed.

## 2020-05-30 NOTE — NUR
P: TEARFUL, ISOLATIVE, ASKED ABOUT SUICIDAL IDEATIONS, PATIENT STATED "I HAVE
THOUGHTS OF HURTING SELF IF I COULD" NO PLAN, ASKED PATIENT TO CONTRACT FOR
SAFETY, PATIENT STATED "I CAN'T ANSWER THAT ONE" NO GUNS OR WEAPONS.
I: NURSE PRACTIONER ON UNIT AND NOTIFIED. CONTINUE 15 MINUTE SAFETY CHECK AND
ENCOURAGE PATIENT TO COME OUT OF ROOM FOR SOCIAL INTERACTIONS.
R: EFFECTIVE. PATIENT UP AND OUT OF ROOM THIS AFTERNOON. INTERACTIVE WITH
NURSING STAFF. PATIENT IS ALERT TO PERSON, PLACE, TIME AND SITUATION. ABLE TO
VOICE NEEDS. VERY HARD OF HEARING. MOOD IS DEPRESSED. DENIES ANY
HALLUCINATIONS, DLEUSION, HI OR PAIN. 1 PERSON ASSIST WITH ACTIVITIES OF DAILY
LIVING, CONTINENT OF BOWEL AND BLADDER. SET UP FOR MEALS, INTAKES VARY WITH
MUCH ENCOURAGEMENT. MEDICATION COMPLIANT WITH EDUCATION. Q 15 MINUTE SAFETY
CHECKS. AMBULATORY WITH 1 PERSON ASSIST. CONTINUE TO MONITOR SI, MOOD AND
MEDICATION COMPLAINCE.  PROVIDE ONE ON ONE FOR EMOTIONAL SUPPORT, CONTRACT
FOR SAFETY, ENCOURAGE GROUP PARTICIPATION AND REDIRECT AS NEEDED.
P:

## 2020-05-30 NOTE — NUR
P-DEPRESSED MOOD, ISOLATIVE, ANXIOUS
 
I-REDIRECTION WITH 1:1 THEAPEUTIC INTERVENTIONS AND COMMUNICATION. EDUCATE AND
ENCOURAGE MEDICATION COMPLIANCE
 
R-MEDICATION NONCOMPLIANT AT HS. PATIENT REFUSED NOURISHMENT BUT PROVIDED
FLUIDS AT HS.  PATIENT WITH DEPRESSED MOOD AT HS. PATIENT PREOCCUPIED WITH HIS
FINANCIAL SITUATION AND ABOUT HIS DAUGHTERS. PATIENT STATING "I WISH I NEVER
MADE ALL THOSE MISTAKES WHEN I WAS YOUNGER AND MAYBE I WOULD HAVE THE MONEY
NOW. I SHOULD HAVE BEEN SMARTER". PATIENT PACING IN HIS ROOM AND ANXIOUS.
PATIENT STATING "I WILL TAKE MEDICATION IF YOU THINK IT WILL HELP, I JUST
DON'T WANT TO HEAR IT WILL TAKE TIME TO WORK. THAT IS ALL EVERYONE TELLS ME".
PATIENT MEDICATED WITH ATIVAN 1MG PO AND WITH EFFECTIVE RESULTS AT THIS TIME.
PATIENT STATING "I WANT TO TALK TO THE DOCTOR TOMORROW. I HAVE TO GET OUT OF
HERE. I MAY NOT HAVE A HOME TO GO BACK TO. THIS IS A TERRIBLE SITUATION THAT
I'M IN. I'VE BEEN HERE FOR THREE WEEKS AND I SIGNED A PAPER SAYING I HAD TO BE
HERE FOR TWO MORE WEEKS. ALL I'M DOING IS LAYING HERE". THIS NURSE ENCOURAGED
PATIENT TO GET OUT OF HIS ROOM AND TAKE A WALK. PATIENT AGREED TO WALKING IN
THE HALLWAY BEFORE RETURNING TO BED.  PATIENT WITH NO HALLUCINATIONS OR
DELUSIONS.  PATIENT WITH NO HOMICIDAL IDEATIONS AND DENIES SUICIDAL IDEATIONS
AT THIS TIME. PATIENT HARD OF HEARING.
 
P-CONTINUE TO ENCOURAGE MEDICATION COMPLIANCE, ENCOURAGE GROUP THERAPY WHILE
AWAKE

## 2020-05-31 VITALS — SYSTOLIC BLOOD PRESSURE: 110 MMHG | DIASTOLIC BLOOD PRESSURE: 57 MMHG

## 2020-05-31 VITALS — DIASTOLIC BLOOD PRESSURE: 66 MMHG

## 2020-05-31 NOTE — NUR
P:  PREOCCUPIED WITH FINANCIAL CONCERNS; DEPRESSED MOOD.
I:  ONE ON ONE FOR EMOTIONAL SUPPORT
R:  EFFECTIVE. PATIENT IS ALERT TO PERSON, PLACE, TIME AND SIUTATION, ABLE TO
VOICE NEED. MOOD IS SLIGHTLY DEPRESSED. DENIES ANY HALLUCINATIONS, DELUSION,
HI/SI OR PAIN. MEDICATION COMPLAINT. Q 15 MINUTE SAFETY CHECKS. 1 PERSON
ASSIST WITH ACTIVITIES OF DAILY LIVING, CONTINENT OF BOWEL AND BLADDER. SET UP
FOR MEALS, INTAKES ARE POOR WITH MUCH ENCOURAGEMENT. INTERACTIVE WITH NURSING
STAFF. WATCHED MOVIE WITH OTHER PATIENTS. AMBULATORY WITH STEADY GAIT.
P: CONTINUE TO MONITOR MOOD, VOICED STATEMENT OF SUICIDAL IDEATIONS AND
MONITOR MEAL INTAKES. PROVIDE ONE ON ONE FOR EMOTIONAL SUPPORT, CONTRACT FOR
SAFETY WHEN FEELING SUICIDAL AND ENCOURAGE MEAL INTAKES.

## 2020-05-31 NOTE — NUR
PATIENT OBSERVED ON Q 15 MIN CHECKS TO HAVE SLEPT APPROX 5 HOURS WITH X1 BRIEF
AWAKENING TO USE THE RESTROOM. NO DISTRESS NOTED.

## 2020-05-31 NOTE — NUR
P: TALKING TO UNSEEN OTHERS, ISOLATIVE TO ROOM, RESTLESS AND PACING BACK AND
FORTH TO ROOM.
I: ONE ON ONE FOR EMOTIONAL SUPPORT, REDIRECTION AS NEEDED.
R: EFFECTIVE. PATIENT IS ALERT TO PERSON, PLACE, TIME AND SITUATION; ABLE TO
VOICE NEEDS. MOOD IS LABILE, PLEASANT DEMEANOR.  DENIES ANY HALLUCINATIONS,
DELUSIONS, HI/SI OR PAIN. MEDICATION COMPLAINT. Q 15 MINUTE SAFETY CHECKS
MAINTAINED. 1 PERSON ASSIST FOR CUEING WITH ACTIVITIES OF DAILY LIVING,
CONTINENT OF BOWEL AND BLADDER, SET UP FOR MEALS, INTAKES VARY WITH MUCH
ENCOURAGEMENT. INTERACTIVE WITH STAFF AND OTHER PATIENTS. PARTICIPATES IN
GROUP SESSION.
P: CONTINUE TO MONITOR FOR HALLUCINATIONS, DELUSIONS, MEDICATION COMPLIANCE
AND MEAL INTAKE. PROVIDE ONE ON ONE FOR EMOTIONAL SUPPORT, REDIRECT, ENCOURAGE
MEDICATION COMPLIANCE AND MEAL INTAKES.

## 2020-05-31 NOTE — NUR
P-DEPRESSED MOOD, PREOCCUPIED.
 
I-PROVIDE 1:1 WITH THERAPEUTIC INTERVENTIONS. PROVIDE SUPPORT. ENCOURAGE
MEDICATION COMPLIANCE AND EDUCATE. MONITOR SLEEP.
 
R-PATIENT ALERT AND ORIENTED X3 WITH CONFUSION. PT MORE INTERACTIVE THIS HS
THAN THE PREVIOUS SHIFT THIS RN WORKED. PT ATE SNACK, WATCHED A MOVIE, AND
REQUESTED A BEARD TRIM. DURING 1:1 PATIENT REMAINS PREOCCUPIED WITH HIS
FINANCES AND STATED "I KNOW I JUST MADE IT WORSE ON MY DAUGHTERS AND MESSED
THINGS UP FOR THEM". WHEN QUESTIONED AS TO WHY, PATIENT STATED HE CREATED A
LOT OF CREDIT CARD DEBIT FOR THEM AND HE DOESN'T KNOW HOW HIS DEPRESSION WILL
EVER GET BETTER WHEN HE IS GOING TO ALWAYS FEEL BAD ABOUT DOING THAT. PT
PROVIDED WITH SUPPORT AND CONTRACTED FOR SAFETY. PT ALSO NOTED TO BE
PREOCCUPIED WITH RULES OF THE UNIT AND AT TIMES HAS UNDERLYING IRRITABILITY
WHEN APPROACHING STAFF IN QUESTION OF THEM. PT OTHERWISE CALM, EASILY
REDIRECTED AS NEEDED. PT DENIES SI/HI, HALLUCINATIONS, OR PAIN. PT MEDICATION
COMPLIANT WITHOUT DIFFICULTY AFTER REVIEW. PT AMBULATORY WITH A STEADY GAIT,
INDEPENDENT IN ADL'S, CONTINENT OF BOWEL AND BLADDER. PT CURRENTLY LAYING
DOWN WITH EYES CLOSED, RESPIRATIONS EASY AND REGULAR, NO SIGNS OR SYMPTOMS OF
DISTRESS NOTED.
 
P-CONTINUE TO MONITOR MOOD AND BEHAVIORS. MAINTAIN Q 15 MIN CHECKS AND PRN FOR
SAFETY.

## 2020-05-31 NOTE — NUR
P-DEPRESSED MOOD, ISOLATIVE.
 
I-PROVIDE 1:1 WITH THERAPEUTIC INTERVENTIONS. PROVIDE SUPPORT. ENCOURAGE
MEDICATION COMPLIANCE AND EDUCATE. MONITOR SLEEP.
 
R-PATIENT ALERT AND ORIENTED X3 WITH CONFUSION. PT ISOLATIVE TO SELF AND
GUARDED THIS HS, STATED TO THIS RN "YOU JUST WOULDNT UNDERSTAND". PT WOULD NOT
FURTHER ELABORATE AS TO WHAT WAS BOTHERING HIM AND REFUSED SUPPORT. PT ALSO
WITH UNDERLYING IRRITABILITY NOTED AND WILL WALK AWAY FROM STAFF MID
CONVERSATION. PT GIVEN SPACE TO HELP CALM. PT ALSO ENCOURAGED TO SHOWER TO
ASSIST IN HELPING HIM RELAX. BOTH INTERVENTIONS EFFECTIVE AT THIS TIME. PT
MEDICATION COMPLIANT WITHOUT DIFFICULTY AFTER REVIEW. PT VOICES NO SI/HI,
HALLUCINATIONS OR PAIN. PT CONTRACTED FOR SAFETY. PT AMBULATORY WITH A STEADY
GAIT, INDEPENDENT IN ADL'S, CONTINENT OF BOWEL AND BLADDER. PT CURRENTLY
LAYING DOWN WITH EYES CLOSED, RESPIRATIONS EASY AND REGULAR, NO SIGNS OR
SYMPTOMS OF DISTRESS NOTED.
 
P-CONTINUE TO MONITOR MOOD AND BEHAVIORS. MAINTAIN Q 15 MIN CHECKS AND PRN FOR
SAFETY.

## 2020-06-01 VITALS — DIASTOLIC BLOOD PRESSURE: 67 MMHG | SYSTOLIC BLOOD PRESSURE: 126 MMHG

## 2020-06-01 VITALS — DIASTOLIC BLOOD PRESSURE: 71 MMHG | SYSTOLIC BLOOD PRESSURE: 137 MMHG

## 2020-06-01 NOTE — NUR
Treatment plan meeting was held via Telephone with Dr. Cuenca, PERCY Wilkes, FREDERICK,
LISW-S and Discharge Planner. Plan for discharge Tuesday. Plan is for Pt. to
return home.

## 2020-06-01 NOTE — NUR
PATIENT OBSERVED ON Q 15 MIN CHECKS TO HAVE SLEPT APPROX 6.5 HOURS
UNINTERRUPTED. NO DISTRESS NOTED.

## 2020-06-01 NOTE — NUR
Spoke with pt's daughter Franchesca Mendoza. Requested clarification about pt's
home situation as pt continue to express concerns and add new concerns about
the condition of his home. Franchesca stated that pt's bathtub drain is clogged,
but she has a  coming to the house. Pt's stovetop is broken, but pt's
oven works and he uses an electric skillet also. Franchesca is working with a
service to clean the drain leading from pt's home in the basement to the
outside. Franchesca again stated that she is not finding any concerns in pt's
finances. When asked, Franchesca stated that she is wanting and believing that pt
can return home. Informed Franchesca that a call to APS will be made when pt
discharges in order to assess for additional support service that can be
offered through the [facility].

## 2020-06-01 NOTE — NUR
Patient eating breakfast in quiet room.  Respirations easy and
regular.  Vital signs stable. No overt distress.
WENDI WALKER PMHNP-BC on unit to see pt at this time, update given.

## 2020-06-01 NOTE — NUR
P:  PREOCCUPIED WITH FINANCIAL ISSUE AND HAVING FINANCIAL BURDEN TO HIS
DAUGHTERS; DEPRESSED MOOD.
I:  ONE ON ONE FOR EMOTIONAL SUPPORT
R:  EFFECTIVE. PATIENT IS ALERT TO PERSON, PLACE, TIME AND SIUTATION, ABLE TO
VOICE NEED. MOOD IS SLIGHTLY DEPRESSED. DENIES ANY HALLUCINATIONS, DELUSION,
HI/SI OR PAIN. MEDICATION COMPLAINT. Q 15 MINUTE SAFETY CHECKS. 1 PERSON
ASSIST WITH ACTIVITIES OF DAILY LIVING, CONTINENT OF BOWEL AND BLADDER. SET UP
FOR MEALS, INTAKES ARE POOR WITH MUCH ENCOURAGEMENT. INTERACTIVE WITH NURSING
STAFF AND OTHER PATIENTS.  PARTICIPATED IN AFTERNOON GROUP SESSION.
AMBULATORY WITH STEADY GAIT.
P: CONTINUE TO MONITOR MOOD, VOICED STATEMENT OF SUICIDAL IDEATIONS AND
MONITOR MEAL INTAKES. PROVIDE ONE ON ONE FOR EMOTIONAL SUPPORT, CONTRACT FOR
SAFETY WHEN FEELING SUICIDAL AND ENCOURAGE MEAL INTAKES.

## 2020-06-02 VITALS — DIASTOLIC BLOOD PRESSURE: 67 MMHG

## 2020-06-02 VITALS — DIASTOLIC BLOOD PRESSURE: 73 MMHG | SYSTOLIC BLOOD PRESSURE: 143 MMHG

## 2020-06-02 NOTE — NUR
Treatment Plan meeting was held this a.m. with Dr. Cuenca, PERCY Wilkes, RN and
Discharge Planner. Plan for discharge Wednesday. Pt. will return home.

## 2020-06-02 NOTE — NUR
Spoke with Pt. Daughter Franchesca via telephone. Advised of Plans to discharge
tommorow. Franchesca will pick patient up at 4:00 p.m.

## 2020-06-02 NOTE — NUR
P-DEPRESSED MOOD.
 
I-PROVIDE 1:1 WITH THERAPEUTIC INTERVENTIONS. PROVIDE SUPPORT. ENCOURAGE
MEDICATION COMPLIANCE AND EDUCATE. MONITOR SLEEP.
 
R-PATIENT ALERT AND ORIENTED X4 WITH INTERMITTENT CONFUSION.  PT CALM,
PLEASANT, AND INTERACTIVE THIS HS. PT SAT IN DINING ROOM AND WATCHED A MOVIE
WITH PEERS, ATE HS SNACK. PT CONTINUES TO BE PREOCCUPIED WITH HIS FINANCIAL
SITUATION AND STATES IT IS WHAT MAKES HIM DEPRESSED, SUPPORT OFFERED. PT
DENIES SI/HI, HALLUCINATIONS OR PAIN, CONTRACTED FOR SAFETY. PT MEDICATION
COMPLIANT WITH OUT DIFFICULTY AFTER REVIEW. PT SHOWERED THIS HS AS REQUESTED.
PT AMBULATORY WITH A STEADY GAIT, INDEPENDENT IN ADL'S, CONTINENT OF BOWEL AND
BLADDER. PT CURRENTLY LAYING DOWN WITH EYES CLOSED, RESPIRATIONS EASY AND
REGULAR, NO SIGNS OR SYMPTOMS OF DISTRESS NOTED.
 
P-CONTINUE TO MONITOR MOOD AND BEHAVIORS. MAINTAIN Q 15 MIN CHECKS AND PRN FOR
SAFETY.

## 2020-06-02 NOTE — NUR
NO ADVERSE MOODS OR BEHAVIORS NOTED. A&O X4. STABLE MOOD. NO HALLUCINATIONS OR
DELUSIONS NOTED. SEE [facility] FLOWSHEET FOR SPECIFIC MONITORING.

## 2020-06-02 NOTE — NUR
Patient alert and oriented x4. Mood calm,pleasant,cooperative but sad. Patient
fixating on financial situation and how he has disappointed his daughters.
Emotional support and therapeutic communication offered and patient
receptive. Patient denies any hallucinations. No overt s/s of any responding
to internal stimuli noted at this time. Patient compliant with HS medications
without any difficulty. No adverse behaviors noted. Plan to continue to
encourage medication compliance. Also continue to provide emotional support
and therapeutic communication. Will continue to monitor moods/behaviors. Q 15
minute safety checks continued and maintained. See [facility] flowsheet for further
documentation.

## 2020-06-03 VITALS — DIASTOLIC BLOOD PRESSURE: 57 MMHG

## 2020-06-03 NOTE — NUR
Treatment Plan meeting was held this a.m. via telephone with Dr. Cuenca, PERCY Wilkes, RN, LISW-S and Discharge Planner in attendance. Plan for discharge
today with return home. Follow up is Scheduled with [facility]
6/8/2020 11:30 a.m. Primary Care Follow up with Dr. Sloan office at [facility] in [place] 6/15/2020 1:00 p.m. Call has been placed
and referral given to APS to Follow for Self Neglect due to Patient Complaints
of his living conditions which were not substantiated by Daughter.

## 2020-06-03 NOTE — NUR
DAUGHTER IN FRONT LOBBY READY FOR PATIENT TO BE DISCHARGED. PATIENT REFUSED TO
SIGN PAPERWORK. ALL BELONGING GATHERED. PATIENT ASSISTED TO WHEELCHAIR.
PATIENT ASSISTED OFF UNIT WITH BELONGING AND DISCHARGE INSTRUCTIONS TO PRIVATE
VEHICLE.

## 2020-06-03 NOTE — NUR
Orders received From Dr. Hernandez for Home Health. Pt. first choice had been
[facility] but they are out of Network. Second Choice is [facility]. Referral Faxed to [facility] for Nurse to follow
for Diabetes Management and Education and Follow Up and New Medication
Management.

## 2020-06-12 ENCOUNTER — HOSPITAL ENCOUNTER (OUTPATIENT)
Dept: HOSPITAL 83 - RESCLI | Age: 85
Discharge: HOME | End: 2020-06-12
Attending: EMERGENCY MEDICINE
Payer: MEDICARE

## 2020-06-12 DIAGNOSIS — Z79.4: ICD-10-CM

## 2020-06-12 DIAGNOSIS — Z88.8: ICD-10-CM

## 2020-06-12 DIAGNOSIS — E11.65: Primary | ICD-10-CM

## 2020-06-12 DIAGNOSIS — E78.2: ICD-10-CM

## 2020-06-12 DIAGNOSIS — Z79.899: ICD-10-CM

## 2020-06-12 DIAGNOSIS — I10: ICD-10-CM

## 2020-06-12 DIAGNOSIS — E55.9: ICD-10-CM

## 2020-06-12 DIAGNOSIS — K59.00: ICD-10-CM

## 2020-06-12 DIAGNOSIS — R79.89: ICD-10-CM

## 2020-06-12 DIAGNOSIS — R63.4: ICD-10-CM

## 2020-07-02 ENCOUNTER — OFFICE VISIT (OUTPATIENT)
Dept: CARDIOLOGY CLINIC | Age: 85
End: 2020-07-02
Payer: MEDICARE

## 2020-07-02 VITALS
BODY MASS INDEX: 31.08 KG/M2 | HEART RATE: 97 BPM | WEIGHT: 198 LBS | HEIGHT: 67 IN | RESPIRATION RATE: 18 BRPM | SYSTOLIC BLOOD PRESSURE: 128 MMHG | DIASTOLIC BLOOD PRESSURE: 76 MMHG

## 2020-07-02 PROBLEM — Z96.1 PRESENCE OF INTRAOCULAR LENS: Status: ACTIVE | Noted: 2020-07-02

## 2020-07-02 PROCEDURE — 99204 OFFICE O/P NEW MOD 45 MIN: CPT | Performed by: INTERNAL MEDICINE

## 2020-07-02 PROCEDURE — 93000 ELECTROCARDIOGRAM COMPLETE: CPT | Performed by: INTERNAL MEDICINE

## 2020-07-02 RX ORDER — INSULIN HUMAN 100 [IU]/ML
INJECTION, SUSPENSION SUBCUTANEOUS
COMMUNITY
Start: 2020-05-22 | End: 2022-02-09

## 2020-07-02 RX ORDER — LISINOPRIL AND HYDROCHLOROTHIAZIDE 12.5; 1 MG/1; MG/1
1 TABLET ORAL DAILY
COMMUNITY
Start: 2020-05-20 | End: 2022-02-09

## 2020-07-02 RX ORDER — OLANZAPINE 5 MG/1
1 TABLET ORAL NIGHTLY
COMMUNITY
Start: 2020-06-27 | End: 2022-02-09

## 2020-07-02 RX ORDER — VIT A/VIT C/VIT E/ZINC/COPPER 2148-113
TABLET ORAL
COMMUNITY
End: 2022-02-09

## 2020-07-02 RX ORDER — DULAGLUTIDE 1.5 MG/.5ML
1.5 INJECTION, SOLUTION SUBCUTANEOUS WEEKLY
COMMUNITY
Start: 2020-05-06

## 2020-07-02 RX ORDER — MIRTAZAPINE 30 MG/1
1 TABLET, FILM COATED ORAL NIGHTLY
COMMUNITY
Start: 2020-06-27 | End: 2022-10-13

## 2020-07-02 RX ORDER — MEMANTINE HYDROCHLORIDE 5 MG/1
5 TABLET ORAL 2 TIMES DAILY
COMMUNITY
Start: 2020-06-27

## 2020-07-02 RX ORDER — ESCITALOPRAM OXALATE 10 MG/1
1 TABLET ORAL DAILY
COMMUNITY
Start: 2020-05-20 | End: 2020-07-02

## 2020-07-02 RX ORDER — RIVASTIGMINE TARTRATE 1.5 MG/1
1 CAPSULE ORAL 2 TIMES DAILY
COMMUNITY
Start: 2020-06-27

## 2020-07-02 RX ORDER — MONTELUKAST SODIUM 10 MG/1
1 TABLET ORAL DAILY
COMMUNITY
Start: 2020-06-10 | End: 2022-02-09

## 2020-07-02 RX ORDER — POLYETHYLENE GLYCOL 3350 17 G/17G
POWDER, FOR SOLUTION ORAL
COMMUNITY
Start: 2020-06-15 | End: 2022-02-09

## 2020-07-02 RX ORDER — FAMOTIDINE 20 MG/1
1 TABLET, FILM COATED ORAL DAILY
COMMUNITY
Start: 2020-06-23 | End: 2022-02-09

## 2020-07-02 NOTE — PROGRESS NOTES
OUTPATIENT CARDIOLOGY CONSULT    Name: Evert Lopez    Age: 80 y.o. Date of Service: 7/2/2020    Reason for Consultation: Fluid retention    Referring Physician: Osbaldo Parks MD    History of Present Illness:  Evert Lopez is a 80 y.o. male who presents today for further evaluation of abnormal echocardiogram.  He has history of hypertension and AVNRT, status post ablation in 2015 with Dr. Cammie Temple. Has not been following with cardiology regularly. Was on today with his 2 daughters for evaluation, referred after an echocardiogram showed severe LVH with normal LV systolic function. Apparently a few months ago was having significant problems with fluid retention and confusion/delirium question of dementia. There have been to medication changes related to his diabetes and things seem to be stabilized. Edema has resolved. He denies chest pain, shortness of breath, palpitations. He is not too active with the current quarantine situation but prior to this was walking and exercising somewhat regularly. He denies any complaints at this time, and his daughters concur that he is doing well overall after recovering from the recent issues. Review of Systems:  Complete review of systems otherwise negative except as described above.     Past Medical History:  Past Medical History:   Diagnosis Date    Arthritis     Cataract     Diabetes mellitus ([address]     Difficulty hearing     Hyperlipidemia     Hypertension     Palpitations     Ringing in ears        Past Surgical History:  Past Surgical History:   Procedure Laterality Date    ABLATION OF DYSRHYTHMIC FOCUS  1-    Dr. Dominic Greenberg    INTRAOCULAR LENS PROSTHESIS INSERTION  11/4/2009 and 12/9/2009    NOSE SURGERY      TOTAL KNEE ARTHROPLASTY Right 7/2008    TOTAL KNEE ARTHROPLASTY Left 6/2009       Family History:  Family History   Problem Relation Age of Onset    Heart Disease Mother         Brendolyn Peasant Cancer Father 54        lung ca       Social History:  Social History     Tobacco Use    Smoking status: Never Smoker    Smokeless tobacco: Never Used   Substance Use Topics    Alcohol use: No    Drug use: No        Allergies:  No Known Allergies    Current Medications:    Current Outpatient Medications:     mirtazapine (REMERON) 30 MG tablet, Take 1 tablet by mouth nightly, Disp: , Rfl:     TRULICITY 1.5 NM/5.6MD SOPN, Inject 1.5 Syringes into the skin once a week , Disp: , Rfl:     HUMULIN 70/30 KWIKPEN (70-30) 100 UNIT/ML injection pen, inject subcutaneously 55 twice a day before BREAKFAST AND DINNER,...  (REFER TO PRESCRIPTION NOTES). , Disp: , Rfl:     lisinopril-hydroCHLOROthiazide (PRINZIDE;ZESTORETIC) 10-12.5 MG per tablet, Take 1 tablet by mouth daily, Disp: , Rfl:     polyethylene glycol (MIRALAX) 17 g PACK packet, MIX 1 PACKET WITH 8 OUNCES OF FLUID AND DRINK ONCE A DAY, Disp: , Rfl:     rivastigmine (EXELON) 6 MG capsule, Take 1 capsule by mouth 2 times daily, Disp: , Rfl:     OLANZapine (ZYPREXA) 5 MG tablet, Take 1 tablet by mouth nightly, Disp: , Rfl:     memantine (NAMENDA) 10 MG tablet, Take 1 tablet by mouth 2 times daily, Disp: , Rfl:     montelukast (SINGULAIR) 10 MG tablet, Take 1 tablet by mouth daily, Disp: , Rfl:     famotidine (PEPCID) 20 MG tablet, Take 1 tablet by mouth daily, Disp: , Rfl:     Multiple Vitamins-Minerals (PRESERVISION AREDS) TABS, Take by mouth, Disp: , Rfl:     Omega 3-Lutein-Zeaxanthin (ADVANCED EYE HEALTH PO), Take 2 tablets by mouth 2 times daily. , Disp: , Rfl:     simvastatin (ZOCOR) 40 MG tablet, nightly., Disp: , Rfl: 0    aspirin 81 MG tablet, Take 81 mg by mouth daily. , Disp: , Rfl:     docusate sodium (COLACE) 100 MG capsule, Take 100 mg by mouth 2 times daily. , Disp: , Rfl:     potassium citrate (UROCIT-K) 10 MEQ (1080 MG) SR tablet, Take  by mouth 3 times daily (with meals). , Disp: , Rfl:     Vitamin D (CHOLECALCIFEROL) 1000 UNITS CAPS capsule, Take 1,000 Units by mouth daily. , Disp: , Rfl:     Pantoprazole Sodium (PROTONIX) 40 MG PACK packet, Take 40 mg by mouth daily. , Disp: , Rfl:     Physical Exam:  /76   Pulse 97   Resp 18   Ht 5' 7\" (1.702 m)   Wt 198 lb (89.8 kg)   BMI 31.01 kg/m²   Wt Readings from Last 3 Encounters:   07/02/20 198 lb (89.8 kg)   02/25/15 199 lb (90.3 kg)   01/20/15 200 lb (90.7 kg)     Appearance: Well-appearing elderly gentleman, awake, alert, no acute respiratory distress  Skin: Intact, no rash  Eyes: EOMI, no conjunctival erythema  ENMT: Moist mucous membranes. Neck: Supple, no elevated JVP, no carotid bruits  Lungs: Scant dry rales bilateral bases. Cardiac: Regular rhythm with a normal rate. S1 & S2 normal, no murmurs  Abdomen: Soft, nontender, +bowel sounds  Extremities: Moves all extremities x 4, no lower extremity edema  Neurologic: No focal motor deficits apparent, normal mood and affect  Peripheral Pulses: Intact posterior tibial pulses bilaterally    Laboratory Tests:  Lab Results   Component Value Date    CREATININE 1.1 01/16/2015    BUN 24 (H) 01/16/2015     01/16/2015    K 4.1 01/16/2015    CL 96 (L) 01/16/2015    CO2 20 (L) 01/16/2015     Lab Results   Component Value Date    MG 2.0 01/16/2015     Lab Results   Component Value Date    WBC 8.6 01/16/2015    HGB 13.1 01/16/2015    HCT 38.1 01/16/2015    MCV 98.2 01/16/2015     01/16/2015     Lab Results   Component Value Date    ALT 21 01/16/2015    AST 38 01/16/2015    ALKPHOS 48 01/16/2015    BILITOT 1.0 01/16/2015     Lab Results   Component Value Date    TROPONINI <0.01 01/16/2015     Lab Results   Component Value Date    INR 1.4 01/16/2015    PROTIME 14.7 (H) 01/16/2015     Lab Results   Component Value Date    TSH 4.360 (H) 01/16/2015       Cardiac Tests:  ECG: Sinus rhythm 97 bpm.  Left anterior fascicular block. QRS duration 122 ms. Nonspecific ST changes.     Echocardiogram:   Transthoracic echo 2/2017 [facility]  Normal LV function with mild LVH, EF 65 to 70% with stage I diastolic dysfunction. RV size and function normal.  Valves not well visualized. Mild TR. Mild posterior pericardial effusion noted. Transthoracic echo 5/2020, [facility]. LVEF is 50-55%.      There is normal LV segmental wall motion.       Severe concentric left ventricular hypertrophy.       The right ventricular systolic function is normal.       Trace mitral regurgitation.       Mild tricuspid regurgitation. Stress test: N/A    Cardiac catheterization: N/A    EP study /AVNRT ablation 1/2015 Dr. Cisco Carlin:   1. Typical AV node reentry tachycardia. 2.    Successful RF ablation of typical AV node reentry tachycardia. 3.    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 Orders Placed This Encounter   Procedures    EKG 12 lead        Requested Prescriptions      No prescriptions requested or ordered in this encounter        ASSESSMENT / PLAN:  1. Severe LVH. ? Hypertensive heart disease, consider cardiac amyloidosis/other infiltrative disorder. Currently asymptomatic, I believe observation is appropriate at this time  2. 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 Comorbid disease: Hypertension, hyperlipidemia, type 2 diabetes, PUD    Recommendations:  He is stable and asymptomatic from cardiac standpoint. Echocardiogram demonstrating severe left ventricular hypertrophy, without other features suggestive of cardiac amyloidosis (i.e. atrial enlargement, pericardial effusion) or other infiltrative disease though this could be considered, it is most likely related to hypertensive heart disease. Currently with no evidence of congestive heart failure or any volume retention. Blood pressures currently well controlled. No signs of any decompensated arrhythmias.     · Continue current cardiac medications including lisinopril hydrochlorothiazide, statin therapy  · No clear indication for aspirin

## 2020-09-24 ENCOUNTER — HOSPITAL ENCOUNTER (OUTPATIENT)
Dept: HOSPITAL 83 - RESCLI | Age: 85
Discharge: HOME | End: 2020-09-24
Attending: SOCIAL WORKER
Payer: MEDICARE

## 2020-09-24 DIAGNOSIS — Z79.4: ICD-10-CM

## 2020-09-24 DIAGNOSIS — D69.6: ICD-10-CM

## 2020-09-24 DIAGNOSIS — Z79.899: ICD-10-CM

## 2020-09-24 DIAGNOSIS — I51.7: ICD-10-CM

## 2020-09-24 DIAGNOSIS — F41.9: ICD-10-CM

## 2020-09-24 DIAGNOSIS — I10: ICD-10-CM

## 2020-09-24 DIAGNOSIS — G62.9: ICD-10-CM

## 2020-09-24 DIAGNOSIS — J30.2: ICD-10-CM

## 2020-09-24 DIAGNOSIS — E03.9: ICD-10-CM

## 2020-09-24 DIAGNOSIS — K59.00: ICD-10-CM

## 2020-09-24 DIAGNOSIS — E55.9: ICD-10-CM

## 2020-09-24 DIAGNOSIS — E11.65: Primary | ICD-10-CM

## 2020-09-24 DIAGNOSIS — F32.9: ICD-10-CM

## 2020-09-24 DIAGNOSIS — Z98.890: ICD-10-CM

## 2020-09-24 DIAGNOSIS — E78.5: ICD-10-CM

## 2020-09-24 DIAGNOSIS — H90.5: ICD-10-CM

## 2020-09-24 DIAGNOSIS — Z86.2: ICD-10-CM

## 2020-09-24 DIAGNOSIS — K21.9: ICD-10-CM

## 2020-12-04 ENCOUNTER — HOSPITAL ENCOUNTER (OUTPATIENT)
Dept: HOSPITAL 83 - RESCLI | Age: 85
Discharge: HOME | End: 2020-12-04
Attending: INTERNAL MEDICINE
Payer: MEDICARE

## 2020-12-04 DIAGNOSIS — Z91.030: ICD-10-CM

## 2020-12-04 DIAGNOSIS — Z91.09: ICD-10-CM

## 2020-12-04 DIAGNOSIS — I10: ICD-10-CM

## 2020-12-04 DIAGNOSIS — F32.9: ICD-10-CM

## 2020-12-04 DIAGNOSIS — E66.9: ICD-10-CM

## 2020-12-04 DIAGNOSIS — K59.00: ICD-10-CM

## 2020-12-04 DIAGNOSIS — E03.9: ICD-10-CM

## 2020-12-04 DIAGNOSIS — J30.2: ICD-10-CM

## 2020-12-04 DIAGNOSIS — H90.5: ICD-10-CM

## 2020-12-04 DIAGNOSIS — G62.9: ICD-10-CM

## 2020-12-04 DIAGNOSIS — E78.5: ICD-10-CM

## 2020-12-04 DIAGNOSIS — I51.7: ICD-10-CM

## 2020-12-04 DIAGNOSIS — E11.9: ICD-10-CM

## 2020-12-04 DIAGNOSIS — D69.6: ICD-10-CM

## 2020-12-04 DIAGNOSIS — E78.2: ICD-10-CM

## 2020-12-04 DIAGNOSIS — R39.11: ICD-10-CM

## 2020-12-04 DIAGNOSIS — E11.65: Primary | ICD-10-CM

## 2020-12-04 DIAGNOSIS — Z98.890: ICD-10-CM

## 2020-12-04 DIAGNOSIS — Z79.899: ICD-10-CM

## 2020-12-04 DIAGNOSIS — E55.9: ICD-10-CM

## 2020-12-04 DIAGNOSIS — K21.9: ICD-10-CM

## 2020-12-04 DIAGNOSIS — F41.9: ICD-10-CM

## 2020-12-04 DIAGNOSIS — Z79.4: ICD-10-CM

## 2020-12-04 DIAGNOSIS — Z86.2: ICD-10-CM

## 2020-12-04 LAB
BUN SERPL-MCNC: 20 MG/DL (ref 7–24)
CHLORIDE SERPL-SCNC: 104 MMOL/L (ref 98–107)
CREAT SERPL-MCNC: 0.96 MG/DL (ref 0.7–1.3)
POTASSIUM SERPL-SCNC: 4.2 MMOL/L (ref 3.5–5.1)
SODIUM SERPL-SCNC: 140 MMOL/L (ref 136–145)

## 2020-12-21 ENCOUNTER — HOSPITAL ENCOUNTER (INPATIENT)
Dept: HOSPITAL 83 - ED | Age: 85
LOS: 3 days | Discharge: OTHER INSTITUTION | DRG: 177 | End: 2020-12-24
Attending: INTERNAL MEDICINE | Admitting: INTERNAL MEDICINE
Payer: MEDICARE

## 2020-12-21 VITALS — HEIGHT: 70 IN | BODY MASS INDEX: 26.81 KG/M2 | WEIGHT: 187.25 LBS

## 2020-12-21 VITALS — DIASTOLIC BLOOD PRESSURE: 67 MMHG

## 2020-12-21 DIAGNOSIS — E86.0: ICD-10-CM

## 2020-12-21 DIAGNOSIS — Z79.4: ICD-10-CM

## 2020-12-21 DIAGNOSIS — E11.65: ICD-10-CM

## 2020-12-21 DIAGNOSIS — H10.33: ICD-10-CM

## 2020-12-21 DIAGNOSIS — R62.7: ICD-10-CM

## 2020-12-21 DIAGNOSIS — F03.90: ICD-10-CM

## 2020-12-21 DIAGNOSIS — M62.82: ICD-10-CM

## 2020-12-21 DIAGNOSIS — D53.9: ICD-10-CM

## 2020-12-21 DIAGNOSIS — U07.1: Primary | ICD-10-CM

## 2020-12-21 DIAGNOSIS — R74.01: ICD-10-CM

## 2020-12-21 DIAGNOSIS — I10: ICD-10-CM

## 2020-12-21 DIAGNOSIS — E44.1: ICD-10-CM

## 2020-12-21 DIAGNOSIS — R13.10: ICD-10-CM

## 2020-12-21 DIAGNOSIS — G93.41: ICD-10-CM

## 2020-12-21 LAB
ALBUMIN SERPL-MCNC: 3.1 GM/DL (ref 3.1–4.5)
ALP SERPL-CCNC: 52 U/L (ref 45–117)
ALT SERPL W P-5'-P-CCNC: 106 U/L (ref 12–78)
APTT PPP: 23.6 SECONDS (ref 20–32.1)
AST SERPL-CCNC: 543 IU/L (ref 3–35)
BASOPHILS # BLD AUTO: 0 10*3/UL (ref 0–0.1)
BASOPHILS NFR BLD AUTO: 0.1 % (ref 0–1)
BUN SERPL-MCNC: 27 MG/DL (ref 7–24)
CHLORIDE SERPL-SCNC: 106 MMOL/L (ref 98–107)
CK MB SERPL-MCNC: 28.4 NG/ML (ref 0.5–3.6)
CK SERPL-CCNC: (no result) U/L (ref 39–308)
CREAT SERPL-MCNC: 0.96 MG/DL (ref 0.7–1.3)
EOSINOPHIL # BLD AUTO: 0 10*3/UL (ref 0–0.4)
EOSINOPHIL # BLD AUTO: 0.3 % (ref 1–4)
ERYTHROCYTE [DISTWIDTH] IN BLOOD BY AUTOMATED COUNT: 13.2 % (ref 0–14.5)
GLUCOSE UR QL: (no result)
HCT VFR BLD AUTO: 39.5 % (ref 42–52)
INR BLD: 1 (ref 2–3.5)
LYMPHOCYTES # BLD AUTO: 1.4 10*3/UL (ref 1.3–4.4)
LYMPHOCYTES NFR BLD AUTO: 15.6 % (ref 27–41)
MCH RBC QN AUTO: 31.5 PG (ref 27–31)
MCHC RBC AUTO-ENTMCNC: 32.7 G/DL (ref 33–37)
MCV RBC AUTO: 96.3 FL (ref 80–94)
MONOCYTES # BLD AUTO: 0.7 10*3/UL (ref 0.1–1)
MONOCYTES NFR BLD MANUAL: 8 % (ref 3–9)
NEUT #: 7 10*3/UL (ref 2.3–7.9)
NEUT %: 75.6 % (ref 47–73)
NRBC BLD QL AUTO: 0 10*3/UL (ref 0–0)
PH UR STRIP: 6.5 [PH] (ref 4.5–8)
PLATELET # BLD AUTO: 181 10*3/UL (ref 130–400)
PMV BLD AUTO: 9.5 FL (ref 9.6–12.3)
POTASSIUM SERPL-SCNC: 4.4 MMOL/L (ref 3.5–5.1)
PROT SERPL-MCNC: 6.6 GM/DL (ref 6.4–8.2)
RBC # BLD AUTO: 4.1 10*6/UL (ref 4.5–5.9)
RBC #/AREA URNS HPF: (no result) RBC/HPF (ref 0–2)
SODIUM SERPL-SCNC: 138 MMOL/L (ref 136–145)
SP GR UR: 1.02 (ref 1–1.03)
UROBILINOGEN UR STRIP-MCNC: 1 E.U./DL (ref 0–1)
WBC #/AREA URNS HPF: (no result) WBC/HPF (ref 0–5)
WBC NRBC COR # BLD AUTO: 9.3 10*3/UL (ref 4.8–10.8)

## 2020-12-21 NOTE — NUR
PATIENT YELLING OUT AT THIS TIME. RN IN TO CHECK PATIENT. PATIENT REQUESTING
FOOD AND DRINK AT THIS TIME. PATIENT TO HAVE4 BARRIUM SWALLOW DONE TOMORROW,
THIS RN DOES NOT FEEL COMFORTABLE FEEDING OR PROVIDING DRINK TO PATIENT AT THIS
TIME DUE TO RISK FOR ASPIRATION. RN PROVIDED PATIENT WITH MOUTH SWAB AT THIS
TIME. RN WILL CONTINUE TO MONITOR.

## 2020-12-21 NOTE — NUR
PATIENT YELLING OUT AT THIS TIME. RN SPOKE WITH PATIENT AGAIN REGARDING HIM
YELLING OUT. REINFORCED CALL LIGHT WITHIN PATIENT, SHOWING HIM WHERE IT IS
LOCATED IF HE NEEDS IT. PATIENT VERBALIZES UNDERSTANDING AT THIS TIME. RN WILL
CONTINUE TO MONITOR.

## 2020-12-22 VITALS — DIASTOLIC BLOOD PRESSURE: 84 MMHG

## 2020-12-22 VITALS — DIASTOLIC BLOOD PRESSURE: 86 MMHG | SYSTOLIC BLOOD PRESSURE: 140 MMHG

## 2020-12-22 VITALS — DIASTOLIC BLOOD PRESSURE: 88 MMHG

## 2020-12-22 VITALS — DIASTOLIC BLOOD PRESSURE: 85 MMHG

## 2020-12-22 VITALS — DIASTOLIC BLOOD PRESSURE: 84 MMHG | SYSTOLIC BLOOD PRESSURE: 152 MMHG

## 2020-12-22 LAB
ALBUMIN SERPL-MCNC: 2.9 GM/DL (ref 3.1–4.5)
ALP SERPL-CCNC: 51 U/L (ref 45–117)
ALT SERPL W P-5'-P-CCNC: 93 U/L (ref 12–78)
AST SERPL-CCNC: 451 IU/L (ref 3–35)
BASOPHILS # BLD AUTO: 0 10*3/UL (ref 0–0.1)
BASOPHILS NFR BLD AUTO: 0.3 % (ref 0–1)
BUN SERPL-MCNC: 16 MG/DL (ref 7–24)
CHLORIDE SERPL-SCNC: 110 MMOL/L (ref 98–107)
CREAT SERPL-MCNC: 0.74 MG/DL (ref 0.7–1.3)
EOSINOPHIL # BLD AUTO: 0.1 10*3/UL (ref 0–0.4)
EOSINOPHIL # BLD AUTO: 1.3 % (ref 1–4)
ERYTHROCYTE [DISTWIDTH] IN BLOOD BY AUTOMATED COUNT: 13.2 % (ref 0–14.5)
HCT VFR BLD AUTO: 40.3 % (ref 42–52)
LYMPHOCYTES # BLD AUTO: 1.6 10*3/UL (ref 1.3–4.4)
LYMPHOCYTES NFR BLD AUTO: 22.6 % (ref 27–41)
MCH RBC QN AUTO: 31.9 PG (ref 27–31)
MCHC RBC AUTO-ENTMCNC: 32.5 G/DL (ref 33–37)
MCV RBC AUTO: 98.1 FL (ref 80–94)
MONOCYTES # BLD AUTO: 0.6 10*3/UL (ref 0.1–1)
MONOCYTES NFR BLD MANUAL: 8.1 % (ref 3–9)
NEUT #: 4.6 10*3/UL (ref 2.3–7.9)
NEUT %: 67.4 % (ref 47–73)
NRBC BLD QL AUTO: 0 % (ref 0–0)
PLATELET # BLD AUTO: 182 10*3/UL (ref 130–400)
PMV BLD AUTO: 10.2 FL (ref 9.6–12.3)
POTASSIUM SERPL-SCNC: 4 MMOL/L (ref 3.5–5.1)
PROT SERPL-MCNC: 6.2 GM/DL (ref 6.4–8.2)
RBC # BLD AUTO: 4.11 10*6/UL (ref 4.5–5.9)
SODIUM SERPL-SCNC: 142 MMOL/L (ref 136–145)
WBC NRBC COR # BLD AUTO: 6.9 10*3/UL (ref 4.8–10.8)

## 2020-12-22 PROCEDURE — BD1BYZZ FLUOROSCOPY OF MOUTH/OROPHARYNX USING OTHER CONTRAST: ICD-10-PCS

## 2020-12-22 NOTE — NUR
PT RESTING IN BED WITH EYES CLOSED, CALL LIGHT WITHIN REACH, NO ACUTE DISTRESS
NOTED UPON THIS RN EXITING THE ROOM

## 2020-12-22 NOTE — NUR
SPEECH THERAPY
 
Patient referred for MBS due to concerns for dysphagia. Patient presents to ED
with change in mental status. He resides at home independently where his
daughter discovered him with increased confusion and difficulty swallowing. He
has PMHx s/f dementia. Per chart review, patient had liquids "running out of
his mouth" with additional reported deficits in swallowing solids. Recent CAT
scan of head and CXR were unremarkale.
 
Oral mech exam revealed slow lingual and labial movements with reduced
lingual, and labial strength and ROM. He demonstrated some impairment in
following verbal commands with cues however hearing was also a noted deficit.
Patient produced a functional volitional swallow and cough. He was
administered a variety of substances with some clinician assistance required
for feeding. Patient consumed barium coated appelsauce, banana, and cookie
with sips of nectar-like and thin liquid barium via straw. Slight increase in
mastication time required during trial of coarse solid cookie, however he
displayed safety and tolerance of solid food items. He took consecutive sips
of nectar and thin liquid by cup. Transient penetration that cleared the
laryngeal vestibule was observed on one trial of consecutive sips of thin
liquid. He followed verbal cue to reduce sip size to consume one single sip
with no penetration/aspiration noted.
 
Patient recommended to remain on current diet as he demonstrates safety and
tolerance of a regular consistency with thin liquids. No speech therapy
recommended at this time due to safety/tolerance of least restrictive diet.
 
Full dictated report to follow. Thank you for your consultation.
 
Doris Dowell MA CCC-SLP

## 2020-12-22 NOTE — NUR
[facility] IS OUT OF NETWORK FOR THIS PATIENT. LSW WILL FAX REFERRAL TO [facility]
FOR REVIEW.

## 2020-12-22 NOTE — NUR
SPOKE WITH DR DOYLE AT THIS TIME REGARDING PATIENTS BGL OF 65. DR DOYLE TO
ORDER D5 1/2NS AT THIS TIME.

## 2020-12-22 NOTE — NUR
SPEECH THERAPY CALLED AND NOTIFED THIS RN THAT PATIENT TOLERATED BARIUM SWALLOW
WELL WITH SOLIDS AND LIQUIDS.

## 2020-12-22 NOTE — NUR
PT RESTING IN BED WITH EYES OPEN, CALL LIGHT WITHIN REACH, NO ACUTE DISTRESS
NOTED UPON THIS RN EXITING THE ROOM

## 2020-12-22 NOTE — NUR
PHYSICAL THERAPY
Physical therapy evaluation completed. Full details and evaluation to follow.
Moderate complexity skilled PT evaluation performed [id number]. PT will work on
strength, bed mobility, transfers, gait, AD usgae, balance and safety per POC.
Recommend SNF at discharge.
Judy Carrion PT DPT

## 2020-12-22 NOTE — NUR
PROVIDED INCONTINENT CARE TO PATIENT AT THIS TIME. RESPIRATIONS EASY,
NON-LABORED ON ROOM AIR. CALL LIGHT WITHIN REACH. SIDERAILS X2. RN WILL
CONTINUE TO MONITOR.

## 2020-12-22 NOTE — NUR
SPOKE WITH DR DOYLE AT THIS TIME REGARDING PATIENTS BLOOD SUGAR. PER DR DOYLE, OK TO GIVE ANOTHER AMP AND HOLD NORMAL SALINE WHILE STARTING D5 AT
125ML/HR. PATIENT AWAKE, ALERT, TALKING AT THIS TIME. DENIES ANY NEEDS.
RESPIRATIONS EASY, NON-LABORED ON ROOM AIR. CALL LIGHT WITHIN REACH. SIDERAILS
X2. RN WILL CONTINUE TO MONITOR.

## 2020-12-22 NOTE — NUR
PATIENT RESTING IN BED. RR EASY AND NON-LABORED. CALL LIGHT WITHIN REACH. GAVE
PATIENT SOME MOUTH SWABS TO RINSE MOUTH OUT. PATIENT APPEARS TO BE IN NO
DISTRESS AT THIS TIME.

## 2020-12-22 NOTE — NUR
DR DOYLE ON UNIT. PER DR DOYLE, HOLD D5W AND CONTINUE NS AT 125ML/HR AND IF
PATIENT NEEDS COVERAGE OK TO GIVE ANOTHER AMP. RN REMINDED  THAT PATIENT HAS
HAD 2 AMPS TOTAL THUS FAR.

## 2020-12-22 NOTE — NUR
Occupational Therapy evaluation completed on ED Hold with full evaluation to
follow.  Recommend occupational therapy per plan of care and SNF upon
discharge.  Thank you for this referral.
 
Rebeca Carnes OTR/L

## 2020-12-22 NOTE — NUR
PT RESTING IN BED WITH EYES OPEN, NO ACUTE DISTRESS NOTED UPON THIS RN EXITING
THE ROOM, CALL LIGHT WITHIN REACH

## 2020-12-22 NOTE — NUR
PATIENT RESTING IN BED, EATING AT THIS TIME WITH CLAUDE'S ASSITANCE. RR EASY AND
NON-LABORED. CALL LIGHT WITHIN REACH. APPEARS TO BE IN NO DISTRESS AT THIS
TIME.

## 2020-12-22 NOTE — NUR
in to talk to patient.
Patient states lives at HOME with ALONE.
There are FEW steps in the home.
Physician: [facility]
Pharmacy: [facility]
Home health services: NONE
Patient's level of ADLs: MINIMAL ASSIST
Patient has working utilities: YES
DME: NONE
Follow-up physician's appointment after d/c: WILL BE MADE BY RN HOSPITALIST
COORDINATOR AT DISCHARGE
Does patient want to access PORTAL?: NO
Discharge plan : LSW CONTACTED PATIENTS REJI SELF VIA PHONE CALL. PER
DAUGHTER PATIENT DOES RESIDE AT HOME ALONE WITH HER CHECKING IN ON HIM DAILY.
SHE STATED THAT SHE HAS NOTICE A DECLINE IN THE PATIENT RECENTLY. SHE STATED
THAT PRIOR TO APRIL PATIENT WAS INDEPENDENT AND DRIVING TO [facility] ALONE FOR
APPOINTMENT. PATIENT STOPPED DRIVING IN APRIL. THIS PATIENT DOES NOT USE ANY
DME AT HOME. PER CLAIR THE PATIENT FIXES HIS OWN BREAKFAST AND LUNCH. SHE
PROVIDES HIM WITH DINNER. SHE VISITS WITH THE PATIENT AROUND 3PM AND 7PM DAILY.
THIS PATIENT IS CURRENTLY CONFUSED. CLAIR IS AGREEABLE FOR SNF PLACEMENT. WHEN
GIVEN A VERBAL LIST SHE CHOOSE 1. [facility] 2.[facility] 3.[facility].
 
LSW FAXED DEMOGRAPHICS TO [facility]. WILL NEED COVID RESULTS AND PT/OT
EVALS TO COMPLETE A REFERRAL.
 
EDGAR GRANGER

## 2020-12-23 VITALS — DIASTOLIC BLOOD PRESSURE: 80 MMHG | SYSTOLIC BLOOD PRESSURE: 128 MMHG

## 2020-12-23 VITALS — SYSTOLIC BLOOD PRESSURE: 132 MMHG | DIASTOLIC BLOOD PRESSURE: 82 MMHG

## 2020-12-23 VITALS — DIASTOLIC BLOOD PRESSURE: 92 MMHG

## 2020-12-23 VITALS — DIASTOLIC BLOOD PRESSURE: 80 MMHG

## 2020-12-23 LAB
BASOPHILS # BLD AUTO: 0 10*3/UL (ref 0–0.1)
BASOPHILS NFR BLD AUTO: 0.1 % (ref 0–1)
BUN SERPL-MCNC: 13 MG/DL (ref 7–24)
CHLORIDE SERPL-SCNC: 106 MMOL/L (ref 98–107)
CK SERPL-CCNC: 2749 U/L (ref 39–308)
CREAT SERPL-MCNC: 0.88 MG/DL (ref 0.7–1.3)
EOSINOPHIL # BLD AUTO: 0.1 10*3/UL (ref 0–0.4)
EOSINOPHIL # BLD AUTO: 1.6 % (ref 1–4)
ERYTHROCYTE [DISTWIDTH] IN BLOOD BY AUTOMATED COUNT: 13.2 % (ref 0–14.5)
HCT VFR BLD AUTO: 40.2 % (ref 42–52)
LYMPHOCYTES # BLD AUTO: 1.6 10*3/UL (ref 1.3–4.4)
LYMPHOCYTES NFR BLD AUTO: 24.1 % (ref 27–41)
MCH RBC QN AUTO: 31.1 PG (ref 27–31)
MCHC RBC AUTO-ENTMCNC: 31.8 G/DL (ref 33–37)
MCV RBC AUTO: 97.8 FL (ref 80–94)
MONOCYTES # BLD AUTO: 0.6 10*3/UL (ref 0.1–1)
MONOCYTES NFR BLD MANUAL: 9.1 % (ref 3–9)
NEUT #: 4.4 10*3/UL (ref 2.3–7.9)
NEUT %: 65 % (ref 47–73)
NRBC BLD QL AUTO: 0 % (ref 0–0)
PLATELET # BLD AUTO: 184 10*3/UL (ref 130–400)
PMV BLD AUTO: 10.3 FL (ref 9.6–12.3)
POTASSIUM SERPL-SCNC: 4 MMOL/L (ref 3.5–5.1)
RBC # BLD AUTO: 4.11 10*6/UL (ref 4.5–5.9)
SODIUM SERPL-SCNC: 137 MMOL/L (ref 136–145)
WBC NRBC COR # BLD AUTO: 6.7 10*3/UL (ref 4.8–10.8)

## 2020-12-23 NOTE — NUR
PT RESTING IN BED WITH EYES OPEN, CALL LIGHT WITHIN REACH NO ACUTE DISTRESS
NOTED UPON THIS RN EXITING THE ROOM

## 2020-12-23 NOTE — NUR
PHYSICAL THERAPY
 
Patient seen this am 1;1 for therapy visit and was resting supine in bed upon
therapist arrival. Patient identified by name /  and joined by OT assistant
for observation only this session. Patient reports no c/o's pain at this time
and recorded resting SpO2 95%, HR 86 bpm prior to transfering supine to sit
EOB with MIN A. Patient tolerated several minutes static EOB sit, SBA, then
completed several sit to stand transfers, HHA/CGA, voicing no c/o's of
dizziness this session. Patient able to side step each direction, including
2-3 fwd/bkwd steps without LOB. Patient does fatigue quickly and needed brief
seated rest break between standing trials. Patient became a little "grumpy"
repeatedly stating he was waiting on his breakfast and needed v/c to remain
focus on task in completing all therapy. Patient returned to supine in bed and
remained with call light and tray table in ED room under ED Nursing
Supervision. Will continue per POC as tolerated, total treatment time 16
minutes.    Erasto Matthew, PTA

## 2020-12-23 NOTE — NUR
PATIENT HAS BEEN ACCEPTED TO [facility]. PRECERT HAS BEEN REQUESTED TO BE STARTED.
COVID RESULTS WILL NEED TO BE RETURNED.

## 2020-12-23 NOTE — NUR
OT NOTE
Pt was seen this A.M. 1:1 for 20 minute OT session. Upon arrival pt was supine
in bed. Pt identified by name and  and had no complaints at this time. Pt's
resting heart rate 86 bpm and SpO2 95% on room air. Pt transferred supine to
sit EOB with Norma for assist with upper body. Pt completed multiple sit to
stand transfers from bed level with CGA hand held. Challenged pt's static
standing tolerance needed for increased I in self care tasks and functional
transfers. pt was able to tolerate aprox 60 seconds before sitting due to
fatigue. Pt was unwilling to complete dynamic standing balance activity. While
sitting EOB pt completed AROM to BUE's over all planes for 1 X 10 to increase
and restore maximum functional use. Pt then transferred back into bed sit to
supine with Norma for assist with BLE's. Pt was left supine in bed with call
light in hand, tray table in place, and ED nurse notified. Continue with rec
D/C plan to SNF.
 
TAMRA Fisher

## 2020-12-23 NOTE — NUR
PT DAUGHTER CLAIR CALLED AND SAID SHE WILL PROVIDE A MED LIST TOMORROW AROUND
11AM FOR PT CLAIR [phone]

## 2020-12-23 NOTE — NUR
PRECERT HAS BEEN OBTAINED. PATIENT CAN ADMIT TO [facility] ONCE COVID RESULTS ARE
BACK. PER [facility] PLAN TO ADMIT WILL BE TOMORROW.

## 2020-12-23 NOTE — NUR
PT PROVIDED FRESH ICE WATER AND BOXED LUNCH, PT SITTING UPRIGHT IN BED EATING
BOXED LUNCH NO ACUTE DISTRESS NOTED CALL LIGHT WITHIN IN REACH

## 2020-12-24 VITALS — SYSTOLIC BLOOD PRESSURE: 133 MMHG | DIASTOLIC BLOOD PRESSURE: 78 MMHG

## 2020-12-24 VITALS — DIASTOLIC BLOOD PRESSURE: 75 MMHG

## 2020-12-24 VITALS — DIASTOLIC BLOOD PRESSURE: 74 MMHG

## 2020-12-24 LAB
BUN SERPL-MCNC: 14 MG/DL (ref 7–24)
CHLORIDE SERPL-SCNC: 105 MMOL/L (ref 98–107)
CK SERPL-CCNC: 1185 U/L (ref 39–308)
CREAT SERPL-MCNC: 0.85 MG/DL (ref 0.7–1.3)
POTASSIUM SERPL-SCNC: 3.8 MMOL/L (ref 3.5–5.1)
SODIUM SERPL-SCNC: 137 MMOL/L (ref 136–145)

## 2020-12-24 NOTE — NUR
SPEECH PATHOLOGY
 
Nursing screen complete. This SLP spoke with RN, Rahel, regarding reasoning
for speech consult as pt swallow was recently shown to be WNL per MBS on
12/22. Rahel stated she was unaware of any reasoning for speech screen and had
not gotten a report of any s/s of dysphagia with meals. No ST eval warranted
at this time; however, this department is available for re-consult should pt
status change. Thank you.
 
DARRYL FERREIRA M.A. CCC-SLP

## 2020-12-24 NOTE — NUR
Patient resting quietly with no c/o discomfort. Respirations easy and regular.
Isolation precautions maintained. Vital signs stable. No overt distress.
RINA MAYFIELD R

## 2020-12-24 NOTE — NUR
PHYSICAL THERAPY
 
Patient seen this pm 1:1 for therapy visit and was relaxing supine in bed upon
therapist arrival. Patient identified by name /  and joined by OT assistant
for observation this session. Patient is in COVID 19 ISOLATION precautions and
presented with bouts of increased confusion, requiring multiple v/c's to
complete all therapy task. Patient reports no c/o's pain, transfers supine to
sit EOB with MOD A x 1, tolerating several minutes static EOB sit to collect
himself. Patient completed several sit to stand transfers from low bed
surface, use of wh walker standing support, MIN A, tolerating < 1 minute
static stand each trial. Patient ambulates ad jamie in room, CGA, wh walker,
demonstrating very slow, cautious gait pattern, 10'x 1 around bed, then
additional 20' x 1. Patient demonstrated LOB x 1 during 180 degree turn
around, including POOR walker safety / navigation by bumping into the wall /
furniture several times in room. Patient returned to supine in bed with quick
onset of fatigue and remained with call light, tray table, telephone, bed
alarm for safety. Will continue per POC as tolerated, total treatment time 18
minutes.    Erasto Matthew, PTA

## 2020-12-24 NOTE — NUR
Discharge instructions reviewed with patient/family. Patient receptive and
verbalizes understanding. Follow-up care arranged. Written instructions given
to patient/family.
RINA MAYFIELD

## 2020-12-24 NOTE — NUR
PT RESTING IN BED. BSG-151, SEE EMAR. NO COVERAGE GIVEN AT THIS TIME. CALL
LIGHT IN REACH. BED ALARM ON.

## 2020-12-24 NOTE — NUR
[facility] IS STILL ACCEPTING THIS PATIENT. LSW NOTIFIED RN HOSPITALIST COORDINATOR
PAM.

## 2020-12-24 NOTE — NUR
A 87, admitted to , under the
services of PRIYANKA Gonsalves DO with a diagnosis of DIMENTIA,FAILURE TO
THRIVE,DEHYDRATION.
Chief complaint is RECENT FALL,CONFUSION.
Patient arrived via stretcher from ER.
Monitor applied. Initial assessment completed.
Vital signs taken and recorded.
PRIYANKA GONSALVES DO notified of admission to the unit.
Orders received.
See assessment for past medical history, medications
and allergies.
Patient and/or family oriented to unit. [address]
visitation policy reviewed.
Clothing/patient valuable form completed.
 
EDNA SHAY

## 2020-12-24 NOTE — NUR
PATIENTS IS COVID +. [facility] ACC[facility]PT.

## 2020-12-24 NOTE — NUR
MED REC COMPLET[facility]. SOME MEDICATIONS
MISSING. PATIENT STATES HIS DAUGHTER HANDLES HIS MEDICATIONS AND HE DOES NOT
KNOW THEM OR HAVE A LIST FROM HOME.

## 2020-12-24 NOTE — NUR
LSW CONTACTED PATIENTS DAUGHTER CLAIR. SHE IS NOW AWARE PATIENT IS COVID +.
SHE IS OKAY WITH THE PATIENT GOING TO [facility] TODAY. SHE DOES HAVE AN UPDATE MED
LIST TO PROVIDE TO PATIENTS RN. [facility]IEW TRANSFERED THE CALL TO FR[facility]ERICK BAUM.

## 2020-12-24 NOTE — NUR
OT NOTE
Pt was seen this P.M. 1:1 for 15 minute OT session. Upon arrival pt was supine
in bed. Pt identified by name and  and had complaints of fatigue and
generalized weakness. Pt transferred supine to sit EOB with modA for assist
with upper body. Sit to stand completed from bed level with minAX2  and use of
w/w for UE support. Challenged pt's static standing tolerance needed for
increased I in self care tasks and functional transfers. Pt was able to
tolerate aprox 2-3 minutes at a time before sitting due to fatigue. After a
seated rest break pt completed functional mobility to the bathroom and back
with CGA and use of w/w. Pt presented with bouts of unsteady stance due to
being impulsive requiring Norma to correct. Challenged pt's dynamic standing
balance while weight shifting, crossing midline, and reaching over all planes.
Pt was able to maintain F- standing balance throughout requiring UE support.
Pt transferred back into bed sit to supine with modA. There he was left with
call light in hand, tray table in place, and bed alarm activated for safety.
Throughout entire session airborne precautions were maintained. Continue with
rec d/C plan to SNF.
 
TERESA Fisher/L

## 2020-12-28 NOTE — NUR
OCCUPATIONAL THERAPY CO-SIGN
 
I approve of the Occupational Therapy notes written above.
 
STAR HANSEN, OTR/L

## 2021-02-01 ENCOUNTER — HOSPITAL ENCOUNTER (OUTPATIENT)
Dept: HOSPITAL 83 - ED | Age: 86
Setting detail: OBSERVATION
LOS: 1 days | Discharge: NURSING, REHAB OR LONG-TERM CARE | End: 2021-02-02
Attending: INTERNAL MEDICINE | Admitting: INTERNAL MEDICINE
Payer: MEDICARE

## 2021-02-01 VITALS — DIASTOLIC BLOOD PRESSURE: 77 MMHG | SYSTOLIC BLOOD PRESSURE: 127 MMHG

## 2021-02-01 VITALS — WEIGHT: 181.56 LBS | BODY MASS INDEX: 24.59 KG/M2 | HEIGHT: 71.97 IN

## 2021-02-01 DIAGNOSIS — F03.90: ICD-10-CM

## 2021-02-01 DIAGNOSIS — D64.9: ICD-10-CM

## 2021-02-01 DIAGNOSIS — Z98.890: ICD-10-CM
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
## 2021-02-01 DIAGNOSIS — S01.312A: ICD-10-CM

## 2021-02-01 DIAGNOSIS — I10: ICD-10-CM
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
## 2021-02-01 DIAGNOSIS — I47.1: Primary | ICD-10-CM

## 2021-02-01 DIAGNOSIS — E11.65: ICD-10-CM

## 2021-02-01 DIAGNOSIS — R77.8: ICD-10-CM

## 2021-02-01 DIAGNOSIS — R74.01: ICD-10-CM
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
## 2021-02-01 DIAGNOSIS — G93.41: ICD-10-CM
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
## 2021-02-01 DIAGNOSIS — D72.829: ICD-10-CM

## 2021-02-01 DIAGNOSIS — H90.3: ICD-10-CM

## 2021-02-01 DIAGNOSIS — R62.7: ICD-10-CM

## 2021-02-01 DIAGNOSIS — F32.9: ICD-10-CM

## 2021-02-01 LAB
ALBUMIN SERPL-MCNC: 3.5 GM/DL (ref 3.1–4.5)
ALP SERPL-CCNC: 64 U/L (ref 45–117)
ALT SERPL W P-5'-P-CCNC: 31 U/L (ref 12–78)
AST SERPL-CCNC: 38 IU/L (ref 3–35)
BASOPHILS # BLD AUTO: 0 10*3/UL (ref 0–0.1)
BASOPHILS # BLD AUTO: 0 10*3/UL (ref 0–0.1)
BASOPHILS NFR BLD AUTO: 0.2 % (ref 0–1)
BASOPHILS NFR BLD AUTO: 0.2 % (ref 0–1)
BUN SERPL-MCNC: 16 MG/DL (ref 7–24)
CHLORIDE SERPL-SCNC: 95 MMOL/L (ref 98–107)
CREAT SERPL-MCNC: 1.16 MG/DL (ref 0.7–1.3)
EOSINOPHIL # BLD AUTO: 0.1 10*3/UL (ref 0–0.4)
EOSINOPHIL # BLD AUTO: 0.1 10*3/UL (ref 0–0.4)
EOSINOPHIL # BLD AUTO: 0.8 % (ref 1–4)
EOSINOPHIL # BLD AUTO: 0.9 % (ref 1–4)
ERYTHROCYTE [DISTWIDTH] IN BLOOD BY AUTOMATED COUNT: 12.6 % (ref 0–14.5)
ERYTHROCYTE [DISTWIDTH] IN BLOOD BY AUTOMATED COUNT: 12.6 % (ref 0–14.5)
HCT VFR BLD AUTO: 34 % (ref 42–52)
HCT VFR BLD AUTO: 36.1 % (ref 42–52)
INR BLD: 1.1 (ref 2–3.5)
LYMPHOCYTES # BLD AUTO: 0.9 10*3/UL (ref 1.3–4.4)
LYMPHOCYTES # BLD AUTO: 1.4 10*3/UL (ref 1.3–4.4)
LYMPHOCYTES NFR BLD AUTO: 13 % (ref 27–41)
LYMPHOCYTES NFR BLD AUTO: 8.7 % (ref 27–41)
MCH RBC QN AUTO: 31.4 PG (ref 27–31)
MCH RBC QN AUTO: 31.5 PG (ref 27–31)
MCHC RBC AUTO-ENTMCNC: 34.7 G/DL (ref 33–37)
MCHC RBC AUTO-ENTMCNC: 34.9 G/DL (ref 33–37)
MCV RBC AUTO: 90.3 FL (ref 80–94)
MCV RBC AUTO: 90.4 FL (ref 80–94)
MONOCYTES # BLD AUTO: 0.7 10*3/UL (ref 0.1–1)
MONOCYTES # BLD AUTO: 0.8 10*3/UL (ref 0.1–1)
MONOCYTES NFR BLD MANUAL: 6.6 % (ref 3–9)
MONOCYTES NFR BLD MANUAL: 7.1 % (ref 3–9)
NEUT #: 8.3 10*3/UL (ref 2.3–7.9)
NEUT #: 8.7 10*3/UL (ref 2.3–7.9)
NEUT %: 78.5 % (ref 47–73)
NEUT %: 83.2 % (ref 47–73)
NRBC BLD QL AUTO: 0 % (ref 0–0)
NRBC BLD QL AUTO: 0 10*3/UL (ref 0–0)
PLATELET # BLD AUTO: 227 10*3/UL (ref 130–400)
PLATELET # BLD AUTO: 230 10*3/UL (ref 130–400)
PMV BLD AUTO: 9.3 FL (ref 9.6–12.3)
PMV BLD AUTO: 9.9 FL (ref 9.6–12.3)
POTASSIUM SERPL-SCNC: 4 MMOL/L (ref 3.5–5.1)
PROT SERPL-MCNC: 7.1 GM/DL (ref 6.4–8.2)
RBC # BLD AUTO: 3.76 10*6/UL (ref 4.5–5.9)
RBC # BLD AUTO: 4 10*6/UL (ref 4.5–5.9)
SODIUM SERPL-SCNC: 129 MMOL/L (ref 136–145)
TROPONIN I SERPL-MCNC: < 0.015 NG/ML (ref ?–0.04)
WBC NRBC COR # BLD AUTO: 10 10*3/UL (ref 4.8–10.8)
WBC NRBC COR # BLD AUTO: 11.1 10*3/UL (ref 4.8–10.8)

## 2021-02-02 VITALS — SYSTOLIC BLOOD PRESSURE: 108 MMHG | DIASTOLIC BLOOD PRESSURE: 66 MMHG

## 2021-02-02 VITALS — SYSTOLIC BLOOD PRESSURE: 112 MMHG | DIASTOLIC BLOOD PRESSURE: 64 MMHG

## 2021-02-02 VITALS — DIASTOLIC BLOOD PRESSURE: 72 MMHG

## 2021-02-02 VITALS — DIASTOLIC BLOOD PRESSURE: 78 MMHG | SYSTOLIC BLOOD PRESSURE: 110 MMHG

## 2021-02-02 VITALS — DIASTOLIC BLOOD PRESSURE: 68 MMHG

## 2021-02-02 LAB
ALBUMIN SERPL-MCNC: 3 GM/DL (ref 3.1–4.5)
ALP SERPL-CCNC: 54 U/L (ref 45–117)
ALT SERPL W P-5'-P-CCNC: 25 U/L (ref 12–78)
APTT PPP: 28.5 SECONDS (ref 20–32.1)
AST SERPL-CCNC: 59 IU/L (ref 3–35)
BASOPHILS # BLD AUTO: 0 10*3/UL (ref 0–0.1)
BASOPHILS NFR BLD AUTO: 0.3 % (ref 0–1)
BUN SERPL-MCNC: 12 MG/DL (ref 7–24)
CHLORIDE SERPL-SCNC: 101 MMOL/L (ref 98–107)
CREAT SERPL-MCNC: 0.91 MG/DL (ref 0.7–1.3)
EOSINOPHIL # BLD AUTO: 0.1 10*3/UL (ref 0–0.4)
EOSINOPHIL # BLD AUTO: 1.4 % (ref 1–4)
ERYTHROCYTE [DISTWIDTH] IN BLOOD BY AUTOMATED COUNT: 12.8 % (ref 0–14.5)
HCT VFR BLD AUTO: 31.4 % (ref 42–52)
INR BLD: 1.1 (ref 2–3.5)
LYMPHOCYTES # BLD AUTO: 1.5 10*3/UL (ref 1.3–4.4)
LYMPHOCYTES NFR BLD AUTO: 23.3 % (ref 27–41)
MCH RBC QN AUTO: 31.4 PG (ref 27–31)
MCHC RBC AUTO-ENTMCNC: 34.1 G/DL (ref 33–37)
MCV RBC AUTO: 92.1 FL (ref 80–94)
MONOCYTES # BLD AUTO: 0.6 10*3/UL (ref 0.1–1)
MONOCYTES NFR BLD MANUAL: 8.7 % (ref 3–9)
NEUT #: 4.2 10*3/UL (ref 2.3–7.9)
NEUT %: 66.1 % (ref 47–73)
NRBC BLD QL AUTO: 0 % (ref 0–0)
PH UR STRIP: 5.5 [PH] (ref 4.5–8)
PLATELET # BLD AUTO: 221 10*3/UL (ref 130–400)
PMV BLD AUTO: 9.7 FL (ref 9.6–12.3)
POTASSIUM SERPL-SCNC: 3.8 MMOL/L (ref 3.5–5.1)
PROT SERPL-MCNC: 6.2 GM/DL (ref 6.4–8.2)
RBC # BLD AUTO: 3.41 10*6/UL (ref 4.5–5.9)
RBC #/AREA URNS HPF: (no result) RBC/HPF (ref 0–2)
SODIUM SERPL-SCNC: 134 MMOL/L (ref 136–145)
SP GR UR: <= 1.005 (ref 1–1.03)
UROBILINOGEN UR STRIP-MCNC: 0.2 E.U./DL (ref 0–1)
WBC #/AREA URNS HPF: (no result) WBC/HPF (ref 0–5)
WBC NRBC COR # BLD AUTO: 6.3 10*3/UL (ref 4.8–10.8)

## 2021-05-19 ENCOUNTER — HOSPITAL ENCOUNTER (INPATIENT)
Dept: HOSPITAL 83 - ED | Age: 86
LOS: 3 days | Discharge: NURSING, REHAB OR LONG-TERM CARE | DRG: 871 | End: 2021-05-22
Attending: INTERNAL MEDICINE | Admitting: INTERNAL MEDICINE
Payer: MEDICARE

## 2021-05-19 VITALS — DIASTOLIC BLOOD PRESSURE: 47 MMHG

## 2021-05-19 VITALS — WEIGHT: 175 LBS | HEIGHT: 72 IN | BODY MASS INDEX: 23.7 KG/M2

## 2021-05-19 VITALS — DIASTOLIC BLOOD PRESSURE: 68 MMHG

## 2021-05-19 VITALS — SYSTOLIC BLOOD PRESSURE: 114 MMHG | DIASTOLIC BLOOD PRESSURE: 47 MMHG

## 2021-05-19 VITALS — DIASTOLIC BLOOD PRESSURE: 98 MMHG

## 2021-05-19 VITALS — DIASTOLIC BLOOD PRESSURE: 88 MMHG

## 2021-05-19 VITALS — SYSTOLIC BLOOD PRESSURE: 150 MMHG | DIASTOLIC BLOOD PRESSURE: 67 MMHG

## 2021-05-19 VITALS — SYSTOLIC BLOOD PRESSURE: 119 MMHG | DIASTOLIC BLOOD PRESSURE: 52 MMHG

## 2021-05-19 VITALS — SYSTOLIC BLOOD PRESSURE: 144 MMHG | DIASTOLIC BLOOD PRESSURE: 100 MMHG

## 2021-05-19 VITALS — SYSTOLIC BLOOD PRESSURE: 112 MMHG | DIASTOLIC BLOOD PRESSURE: 71 MMHG

## 2021-05-19 VITALS — SYSTOLIC BLOOD PRESSURE: 137 MMHG | DIASTOLIC BLOOD PRESSURE: 83 MMHG

## 2021-05-19 VITALS — DIASTOLIC BLOOD PRESSURE: 86 MMHG

## 2021-05-19 VITALS — DIASTOLIC BLOOD PRESSURE: 52 MMHG | SYSTOLIC BLOOD PRESSURE: 119 MMHG

## 2021-05-19 VITALS — DIASTOLIC BLOOD PRESSURE: 50 MMHG

## 2021-05-19 VITALS — DIASTOLIC BLOOD PRESSURE: 71 MMHG | SYSTOLIC BLOOD PRESSURE: 114 MMHG

## 2021-05-19 VITALS — DIASTOLIC BLOOD PRESSURE: 62 MMHG | SYSTOLIC BLOOD PRESSURE: 128 MMHG

## 2021-05-19 VITALS — DIASTOLIC BLOOD PRESSURE: 42 MMHG

## 2021-05-19 VITALS — DIASTOLIC BLOOD PRESSURE: 71 MMHG

## 2021-05-19 DIAGNOSIS — E87.2: ICD-10-CM

## 2021-05-19 DIAGNOSIS — A41.9: Primary | ICD-10-CM

## 2021-05-19 DIAGNOSIS — Z79.84: ICD-10-CM

## 2021-05-19 DIAGNOSIS — J15.6: ICD-10-CM

## 2021-05-19 DIAGNOSIS — R62.7: ICD-10-CM

## 2021-05-19 DIAGNOSIS — Z20.822: ICD-10-CM

## 2021-05-19 DIAGNOSIS — E86.0: ICD-10-CM

## 2021-05-19 DIAGNOSIS — I48.92: ICD-10-CM

## 2021-05-19 DIAGNOSIS — Z66: ICD-10-CM

## 2021-05-19 DIAGNOSIS — Z79.899: ICD-10-CM

## 2021-05-19 DIAGNOSIS — I47.1: ICD-10-CM

## 2021-05-19 DIAGNOSIS — E44.0: ICD-10-CM

## 2021-05-19 DIAGNOSIS — G93.41: ICD-10-CM

## 2021-05-19 DIAGNOSIS — E11.9: ICD-10-CM

## 2021-05-19 DIAGNOSIS — F03.91: ICD-10-CM

## 2021-05-19 DIAGNOSIS — H91.90: ICD-10-CM

## 2021-05-19 DIAGNOSIS — D64.9: ICD-10-CM

## 2021-05-19 DIAGNOSIS — Z79.1: ICD-10-CM

## 2021-05-19 DIAGNOSIS — I10: ICD-10-CM

## 2021-05-19 LAB
ALBUMIN SERPL-MCNC: 3.6 GM/DL (ref 3.1–4.5)
ALP SERPL-CCNC: 52 U/L (ref 45–117)
ALT SERPL W P-5'-P-CCNC: 28 U/L (ref 12–78)
AST SERPL-CCNC: 34 IU/L (ref 3–35)
BACTERIA #/AREA URNS HPF: (no result) /[HPF]
BASOPHILS # BLD AUTO: 0 10*3/UL (ref 0–0.1)
BASOPHILS NFR BLD AUTO: 0.2 % (ref 0–1)
BUN SERPL-MCNC: 20 MG/DL (ref 7–24)
CHLORIDE SERPL-SCNC: 107 MMOL/L (ref 98–107)
CREAT SERPL-MCNC: 0.8 MG/DL (ref 0.7–1.3)
EOSINOPHIL # BLD AUTO: 0 10*3/UL (ref 0–0.4)
EOSINOPHIL # BLD AUTO: 0.3 % (ref 1–4)
EPI CELLS #/AREA URNS HPF: (no result) /[HPF]
ERYTHROCYTE [DISTWIDTH] IN BLOOD BY AUTOMATED COUNT: 12.4 % (ref 0–14.5)
HCT VFR BLD AUTO: 41.4 % (ref 42–52)
HGB UR QL STRIP: (no result)
KETONES UR QL STRIP: (no result)
LYMPHOCYTES # BLD AUTO: 1.4 10*3/UL (ref 1.3–4.4)
LYMPHOCYTES NFR BLD AUTO: 11.5 % (ref 27–41)
MCH RBC QN AUTO: 31.7 PG (ref 27–31)
MCHC RBC AUTO-ENTMCNC: 33.6 G/DL (ref 33–37)
MCV RBC AUTO: 94.5 FL (ref 80–94)
MONOCYTES # BLD AUTO: 0.7 10*3/UL (ref 0.1–1)
MONOCYTES NFR BLD MANUAL: 5.6 % (ref 3–9)
NEUT #: 10.2 10*3/UL (ref 2.3–7.9)
NEUT %: 82.1 % (ref 47–73)
NRBC BLD QL AUTO: 0 % (ref 0–0)
PH UR STRIP: 7.5 [PH] (ref 4.5–8)
PLATELET # BLD AUTO: 217 10*3/UL (ref 130–400)
PMV BLD AUTO: 9.8 FL (ref 9.6–12.3)
POTASSIUM SERPL-SCNC: 4.2 MMOL/L (ref 3.5–5.1)
PROT SERPL-MCNC: 7 GM/DL (ref 6.4–8.2)
RBC # BLD AUTO: 4.38 10*6/UL (ref 4.5–5.9)
SODIUM SERPL-SCNC: 137 MMOL/L (ref 136–145)
SP GR UR: 1.01 (ref 1–1.03)
TROPONIN I SERPL-MCNC: < 0.015 NG/ML (ref ?–0.04)
UROBILINOGEN UR STRIP-MCNC: 1 E.U./DL (ref 0–1)
WBC #/AREA URNS HPF: (no result) WBC/HPF (ref 0–5)
WBC NRBC COR # BLD AUTO: 12.4 10*3/UL (ref 4.8–10.8)

## 2021-05-20 VITALS — SYSTOLIC BLOOD PRESSURE: 114 MMHG | DIASTOLIC BLOOD PRESSURE: 56 MMHG

## 2021-05-20 VITALS — SYSTOLIC BLOOD PRESSURE: 112 MMHG | DIASTOLIC BLOOD PRESSURE: 81 MMHG

## 2021-05-20 VITALS — SYSTOLIC BLOOD PRESSURE: 153 MMHG | DIASTOLIC BLOOD PRESSURE: 71 MMHG

## 2021-05-20 VITALS — DIASTOLIC BLOOD PRESSURE: 62 MMHG

## 2021-05-20 VITALS — DIASTOLIC BLOOD PRESSURE: 77 MMHG

## 2021-05-20 VITALS — SYSTOLIC BLOOD PRESSURE: 151 MMHG | DIASTOLIC BLOOD PRESSURE: 76 MMHG

## 2021-05-20 VITALS — DIASTOLIC BLOOD PRESSURE: 64 MMHG

## 2021-05-20 VITALS — DIASTOLIC BLOOD PRESSURE: 66 MMHG

## 2021-05-20 LAB
ALBUMIN SERPL-MCNC: 3.4 GM/DL (ref 3.1–4.5)
ALP SERPL-CCNC: 49 U/L (ref 45–117)
ALT SERPL W P-5'-P-CCNC: 31 U/L (ref 12–78)
AST SERPL-CCNC: 57 IU/L (ref 3–35)
BASOPHILS # BLD AUTO: 0 10*3/UL (ref 0–0.1)
BASOPHILS NFR BLD AUTO: 0.4 % (ref 0–1)
BUN SERPL-MCNC: 16 MG/DL (ref 7–24)
CHLORIDE SERPL-SCNC: 107 MMOL/L (ref 98–107)
CREAT SERPL-MCNC: 0.82 MG/DL (ref 0.7–1.3)
EOSINOPHIL # BLD AUTO: 0.1 10*3/UL (ref 0–0.4)
EOSINOPHIL # BLD AUTO: 0.9 % (ref 1–4)
ERYTHROCYTE [DISTWIDTH] IN BLOOD BY AUTOMATED COUNT: 12.7 % (ref 0–14.5)
HCT VFR BLD AUTO: 37.5 % (ref 42–52)
LYMPHOCYTES # BLD AUTO: 2.4 10*3/UL (ref 1.3–4.4)
LYMPHOCYTES NFR BLD AUTO: 30.4 % (ref 27–41)
MCH RBC QN AUTO: 32.1 PG (ref 27–31)
MCHC RBC AUTO-ENTMCNC: 33.3 G/DL (ref 33–37)
MCV RBC AUTO: 96.4 FL (ref 80–94)
MONOCYTES # BLD AUTO: 0.7 10*3/UL (ref 0.1–1)
MONOCYTES NFR BLD MANUAL: 9.3 % (ref 3–9)
NEUT #: 4.7 10*3/UL (ref 2.3–7.9)
NEUT %: 58.9 % (ref 47–73)
NRBC BLD QL AUTO: 0 10*3/UL (ref 0–0)
PLATELET # BLD AUTO: 194 10*3/UL (ref 130–400)
PMV BLD AUTO: 10.6 FL (ref 9.6–12.3)
POTASSIUM SERPL-SCNC: 3.8 MMOL/L (ref 3.5–5.1)
PROT SERPL-MCNC: 6.7 GM/DL (ref 6.4–8.2)
RBC # BLD AUTO: 3.89 10*6/UL (ref 4.5–5.9)
SODIUM SERPL-SCNC: 138 MMOL/L (ref 136–145)
WBC NRBC COR # BLD AUTO: 7.9 10*3/UL (ref 4.8–10.8)

## 2021-05-20 PROCEDURE — BD11YZZ FLUOROSCOPY OF ESOPHAGUS USING OTHER CONTRAST: ICD-10-PCS | Performed by: INTERNAL MEDICINE

## 2021-05-21 VITALS — DIASTOLIC BLOOD PRESSURE: 86 MMHG

## 2021-05-21 VITALS — SYSTOLIC BLOOD PRESSURE: 132 MMHG | DIASTOLIC BLOOD PRESSURE: 70 MMHG

## 2021-05-21 VITALS — DIASTOLIC BLOOD PRESSURE: 58 MMHG

## 2021-05-21 VITALS — DIASTOLIC BLOOD PRESSURE: 80 MMHG

## 2021-05-22 VITALS — SYSTOLIC BLOOD PRESSURE: 172 MMHG | DIASTOLIC BLOOD PRESSURE: 92 MMHG

## 2021-05-22 VITALS — SYSTOLIC BLOOD PRESSURE: 125 MMHG | DIASTOLIC BLOOD PRESSURE: 62 MMHG

## 2021-05-22 VITALS — DIASTOLIC BLOOD PRESSURE: 56 MMHG

## 2021-11-05 ENCOUNTER — HOSPITAL ENCOUNTER (INPATIENT)
Dept: HOSPITAL 83 - ED | Age: 86
LOS: 2 days | Discharge: NURSING, REHAB OR LONG-TERM CARE | DRG: 291 | End: 2021-11-07
Attending: EMERGENCY MEDICINE | Admitting: EMERGENCY MEDICINE
Payer: MEDICARE

## 2021-11-05 VITALS — DIASTOLIC BLOOD PRESSURE: 75 MMHG | SYSTOLIC BLOOD PRESSURE: 116 MMHG

## 2021-11-05 VITALS — SYSTOLIC BLOOD PRESSURE: 127 MMHG | DIASTOLIC BLOOD PRESSURE: 59 MMHG

## 2021-11-05 VITALS — DIASTOLIC BLOOD PRESSURE: 60 MMHG | SYSTOLIC BLOOD PRESSURE: 108 MMHG

## 2021-11-05 VITALS — DIASTOLIC BLOOD PRESSURE: 49 MMHG

## 2021-11-05 VITALS — SYSTOLIC BLOOD PRESSURE: 110 MMHG | DIASTOLIC BLOOD PRESSURE: 45 MMHG

## 2021-11-05 VITALS — HEIGHT: 70 IN | BODY MASS INDEX: 26.05 KG/M2 | WEIGHT: 182 LBS

## 2021-11-05 VITALS — DIASTOLIC BLOOD PRESSURE: 66 MMHG

## 2021-11-05 VITALS — DIASTOLIC BLOOD PRESSURE: 61 MMHG | SYSTOLIC BLOOD PRESSURE: 108 MMHG

## 2021-11-05 VITALS — DIASTOLIC BLOOD PRESSURE: 53 MMHG

## 2021-11-05 VITALS — DIASTOLIC BLOOD PRESSURE: 59 MMHG

## 2021-11-05 VITALS — DIASTOLIC BLOOD PRESSURE: 64 MMHG

## 2021-11-05 DIAGNOSIS — Z79.899: ICD-10-CM

## 2021-11-05 DIAGNOSIS — J98.11: ICD-10-CM

## 2021-11-05 DIAGNOSIS — I11.0: Primary | ICD-10-CM

## 2021-11-05 DIAGNOSIS — I50.33: ICD-10-CM

## 2021-11-05 DIAGNOSIS — Z79.1: ICD-10-CM

## 2021-11-05 DIAGNOSIS — I47.1: ICD-10-CM

## 2021-11-05 DIAGNOSIS — F03.90: ICD-10-CM

## 2021-11-05 DIAGNOSIS — D64.9: ICD-10-CM

## 2021-11-05 DIAGNOSIS — E11.65: ICD-10-CM

## 2021-11-05 DIAGNOSIS — R65.10: ICD-10-CM

## 2021-11-05 LAB
ALBUMIN SERPL-MCNC: 3.5 GM/DL (ref 3.1–4.5)
ALP SERPL-CCNC: 55 U/L (ref 45–117)
ALT SERPL W P-5'-P-CCNC: 27 U/L (ref 12–78)
APTT PPP: 25.7 SECONDS (ref 20–32.1)
AST SERPL-CCNC: 27 IU/L (ref 3–35)
BASOPHILS # BLD AUTO: 0 10*3/UL (ref 0–0.1)
BASOPHILS NFR BLD AUTO: 0.2 % (ref 0–1)
BUN SERPL-MCNC: 16 MG/DL (ref 7–24)
CHLORIDE SERPL-SCNC: 106 MMOL/L (ref 98–107)
CREAT SERPL-MCNC: 1.01 MG/DL (ref 0.7–1.3)
EOSINOPHIL # BLD AUTO: 0 % (ref 1–4)
EOSINOPHIL # BLD AUTO: 0 10*3/UL (ref 0–0.4)
EPI CELLS #/AREA URNS HPF: (no result) /[HPF]
ERYTHROCYTE [DISTWIDTH] IN BLOOD BY AUTOMATED COUNT: 12.5 % (ref 0–14.5)
GLUCOSE UR QL: (no result)
HCT VFR BLD AUTO: 37 % (ref 42–52)
INR BLD: 1 (ref 2–3.5)
LYMPHOCYTES # BLD AUTO: 0.5 10*3/UL (ref 1.3–4.4)
LYMPHOCYTES NFR BLD AUTO: 7.4 % (ref 27–41)
MCH RBC QN AUTO: 32.9 PG (ref 27–31)
MCHC RBC AUTO-ENTMCNC: 34.6 G/DL (ref 33–37)
MCV RBC AUTO: 95.1 FL (ref 80–94)
MONOCYTES # BLD AUTO: 0.4 10*3/UL (ref 0.1–1)
MONOCYTES NFR BLD MANUAL: 5.9 % (ref 3–9)
NEUT #: 5.6 10*3/UL (ref 2.3–7.9)
NEUT %: 86.2 % (ref 47–73)
NRBC BLD QL AUTO: 0 10*3/UL (ref 0–0)
PH UR STRIP: 6.5 [PH] (ref 4.5–8)
PLATELET # BLD AUTO: 162 10*3/UL (ref 130–400)
PMV BLD AUTO: 9.8 FL (ref 9.6–12.3)
POTASSIUM SERPL-SCNC: 4.2 MMOL/L (ref 3.5–5.1)
PROT SERPL-MCNC: 7.1 GM/DL (ref 6.4–8.2)
RBC # BLD AUTO: 3.89 10*6/UL (ref 4.5–5.9)
RBC #/AREA URNS HPF: (no result) RBC/HPF (ref 0–2)
SODIUM SERPL-SCNC: 138 MMOL/L (ref 136–145)
SP GR UR: 1.01 (ref 1–1.03)
TROPONIN I SERPL-MCNC: < 0.015 NG/ML (ref ?–0.04)
TSH SERPL DL<=0.005 MIU/L-ACNC: 2.2 UIU/ML (ref 0.36–4.75)
UROBILINOGEN UR STRIP-MCNC: 1 E.U./DL (ref 0–1)
WBC #/AREA URNS HPF: (no result) WBC/HPF (ref 0–5)
WBC NRBC COR # BLD AUTO: 6.5 10*3/UL (ref 4.8–10.8)

## 2021-11-06 VITALS — DIASTOLIC BLOOD PRESSURE: 57 MMHG | SYSTOLIC BLOOD PRESSURE: 122 MMHG

## 2021-11-06 VITALS — SYSTOLIC BLOOD PRESSURE: 118 MMHG | DIASTOLIC BLOOD PRESSURE: 52 MMHG

## 2021-11-06 VITALS — DIASTOLIC BLOOD PRESSURE: 51 MMHG | SYSTOLIC BLOOD PRESSURE: 108 MMHG

## 2021-11-06 VITALS — DIASTOLIC BLOOD PRESSURE: 55 MMHG | SYSTOLIC BLOOD PRESSURE: 121 MMHG

## 2021-11-06 VITALS — DIASTOLIC BLOOD PRESSURE: 54 MMHG

## 2021-11-06 VITALS — SYSTOLIC BLOOD PRESSURE: 128 MMHG | DIASTOLIC BLOOD PRESSURE: 59 MMHG

## 2021-11-06 VITALS — SYSTOLIC BLOOD PRESSURE: 116 MMHG | DIASTOLIC BLOOD PRESSURE: 52 MMHG

## 2021-11-06 VITALS — DIASTOLIC BLOOD PRESSURE: 51 MMHG

## 2021-11-06 VITALS — DIASTOLIC BLOOD PRESSURE: 49 MMHG | SYSTOLIC BLOOD PRESSURE: 118 MMHG

## 2021-11-06 VITALS — DIASTOLIC BLOOD PRESSURE: 53 MMHG

## 2021-11-06 VITALS — SYSTOLIC BLOOD PRESSURE: 126 MMHG | DIASTOLIC BLOOD PRESSURE: 62 MMHG

## 2021-11-06 VITALS — SYSTOLIC BLOOD PRESSURE: 110 MMHG | DIASTOLIC BLOOD PRESSURE: 50 MMHG

## 2021-11-06 VITALS — DIASTOLIC BLOOD PRESSURE: 102 MMHG

## 2021-11-06 VITALS — DIASTOLIC BLOOD PRESSURE: 46 MMHG

## 2021-11-06 VITALS — DIASTOLIC BLOOD PRESSURE: 56 MMHG | SYSTOLIC BLOOD PRESSURE: 118 MMHG

## 2021-11-06 VITALS — DIASTOLIC BLOOD PRESSURE: 52 MMHG

## 2021-11-06 VITALS — DIASTOLIC BLOOD PRESSURE: 72 MMHG

## 2021-11-06 VITALS — DIASTOLIC BLOOD PRESSURE: 60 MMHG

## 2021-11-06 LAB
ALBUMIN SERPL-MCNC: 3.1 GM/DL (ref 3.1–4.5)
ALP SERPL-CCNC: 47 U/L (ref 45–117)
ALT SERPL W P-5'-P-CCNC: 25 U/L (ref 12–78)
AST SERPL-CCNC: 27 IU/L (ref 3–35)
BACTERIA #/AREA URNS HPF: (no result) /[HPF]
BASOPHILS # BLD AUTO: 0 10*3/UL (ref 0–0.1)
BASOPHILS NFR BLD AUTO: 0.4 % (ref 0–1)
BUN SERPL-MCNC: 14 MG/DL (ref 7–24)
CHLORIDE SERPL-SCNC: 109 MMOL/L (ref 98–107)
CREAT SERPL-MCNC: 0.85 MG/DL (ref 0.7–1.3)
EOSINOPHIL # BLD AUTO: 0 10*3/UL (ref 0–0.4)
EOSINOPHIL # BLD AUTO: 0.4 % (ref 1–4)
EPI CELLS #/AREA URNS HPF: (no result) /[HPF]
ERYTHROCYTE [DISTWIDTH] IN BLOOD BY AUTOMATED COUNT: 12.7 % (ref 0–14.5)
GLUCOSE UR QL: (no result)
HCT VFR BLD AUTO: 34.2 % (ref 42–52)
KETONES UR QL STRIP: (no result)
LEUKOCYTE ESTERASE UR QL STRIP: (no result)
LYMPHOCYTES # BLD AUTO: 1.1 10*3/UL (ref 1.3–4.4)
LYMPHOCYTES NFR BLD AUTO: 21.3 % (ref 27–41)
MCH RBC QN AUTO: 32.8 PG (ref 27–31)
MCHC RBC AUTO-ENTMCNC: 33.6 G/DL (ref 33–37)
MCV RBC AUTO: 97.4 FL (ref 80–94)
MONOCYTES # BLD AUTO: 0.4 10*3/UL (ref 0.1–1)
MONOCYTES NFR BLD MANUAL: 8.6 % (ref 3–9)
NEUT #: 3.5 10*3/UL (ref 2.3–7.9)
NEUT %: 68.9 % (ref 47–73)
NRBC BLD QL AUTO: 0 10*3/UL (ref 0–0)
PH UR STRIP: 7 [PH] (ref 4.5–8)
PLATELET # BLD AUTO: 139 10*3/UL (ref 130–400)
PMV BLD AUTO: 11.1 FL (ref 9.6–12.3)
POTASSIUM SERPL-SCNC: 3.6 MMOL/L (ref 3.5–5.1)
PROT SERPL-MCNC: 6.2 GM/DL (ref 6.4–8.2)
RBC # BLD AUTO: 3.51 10*6/UL (ref 4.5–5.9)
RBC #/AREA URNS HPF: (no result) RBC/HPF (ref 0–2)
SODIUM SERPL-SCNC: 138 MMOL/L (ref 136–145)
SP GR UR: 1.02 (ref 1–1.03)
UROBILINOGEN UR STRIP-MCNC: 1 E.U./DL (ref 0–1)
WBC #/AREA URNS HPF: (no result) WBC/HPF (ref 0–5)
WBC NRBC COR # BLD AUTO: 5 10*3/UL (ref 4.8–10.8)

## 2021-11-07 VITALS — SYSTOLIC BLOOD PRESSURE: 124 MMHG | DIASTOLIC BLOOD PRESSURE: 63 MMHG

## 2021-11-07 VITALS — DIASTOLIC BLOOD PRESSURE: 66 MMHG | SYSTOLIC BLOOD PRESSURE: 134 MMHG

## 2021-11-07 LAB
BASOPHILS # BLD AUTO: 0 10*3/UL (ref 0–0.1)
BASOPHILS NFR BLD AUTO: 0.1 % (ref 0–1)
BUN BLDV-MCNC: 13 MG/DL (ref 7–24)
BUN SERPL-MCNC: 13 MG/DL (ref 7–24)
CALCIUM SERPL-MCNC: 8.3 MG/DL (ref 8.5–10.5)
CHLORIDE BLD-SCNC: 105 MMOL/L (ref 98–107)
CHLORIDE SERPL-SCNC: 105 MMOL/L (ref 98–107)
CO2: 25 MMOL/L (ref 21–32)
CREAT SERPL-MCNC: 0.83 MG/DL (ref 0.7–1.3)
CREAT SERPL-MCNC: 0.83 MG/DL (ref 0.7–1.3)
EOSINOPHIL # BLD AUTO: 0.1 10*3/UL (ref 0–0.4)
EOSINOPHIL # BLD AUTO: 1 % (ref 1–4)
ERYTHROCYTE [DISTWIDTH] IN BLOOD BY AUTOMATED COUNT: 12.5 % (ref 0–14.5)
GFR AFRICAN AMERICAN: > 60 ML/MIN
GFR SERPL CREATININE-BSD FRML MDRD: >60 ML/MIN/
GLUCOSE: 130 MG/DL (ref 65–99)
HCT VFR BLD AUTO: 39 % (ref 42–52)
LYMPHOCYTES # BLD AUTO: 2.2 10*3/UL (ref 1.3–4.4)
LYMPHOCYTES NFR BLD AUTO: 31.1 % (ref 27–41)
MCH RBC QN AUTO: 32.5 PG (ref 27–31)
MCHC RBC AUTO-ENTMCNC: 33.3 G/DL (ref 33–37)
MCV RBC AUTO: 97.5 FL (ref 80–94)
MONOCYTES # BLD AUTO: 0.8 10*3/UL (ref 0.1–1)
MONOCYTES NFR BLD MANUAL: 11.2 % (ref 3–9)
NEUT #: 4 10*3/UL (ref 2.3–7.9)
NEUT %: 56.5 % (ref 47–73)
NRBC BLD QL AUTO: 0 10*3/UL (ref 0–0)
PLATELET # BLD AUTO: 139 10*3/UL (ref 130–400)
PMV BLD AUTO: 10.4 FL (ref 9.6–12.3)
POTASSIUM SERPL-SCNC: 3.4 MMOL/L (ref 3.5–5.1)
POTASSIUM SERPL-SCNC: 3.4 MMOL/L (ref 3.5–5.1)
RBC # BLD AUTO: 4 10*6/UL (ref 4.5–5.9)
SODIUM BLD-SCNC: 138 MMOL/L (ref 136–145)
SODIUM SERPL-SCNC: 138 MMOL/L (ref 136–145)
WBC NRBC COR # BLD AUTO: 7 10*3/UL (ref 4.8–10.8)

## 2021-12-13 ENCOUNTER — HOSPITAL ENCOUNTER (INPATIENT)
Dept: HOSPITAL 83 - ED | Age: 86
LOS: 2 days | Discharge: NURSING, REHAB OR LONG-TERM CARE | DRG: 308 | End: 2021-12-15
Attending: INTERNAL MEDICINE | Admitting: INTERNAL MEDICINE
Payer: MEDICARE

## 2021-12-13 VITALS — DIASTOLIC BLOOD PRESSURE: 76 MMHG | SYSTOLIC BLOOD PRESSURE: 120 MMHG

## 2021-12-13 VITALS — SYSTOLIC BLOOD PRESSURE: 122 MMHG | DIASTOLIC BLOOD PRESSURE: 77 MMHG

## 2021-12-13 VITALS — WEIGHT: 174.19 LBS | BODY MASS INDEX: 24.94 KG/M2 | HEIGHT: 70 IN

## 2021-12-13 VITALS — DIASTOLIC BLOOD PRESSURE: 76 MMHG

## 2021-12-13 DIAGNOSIS — F32.A: ICD-10-CM

## 2021-12-13 DIAGNOSIS — Z79.1: ICD-10-CM

## 2021-12-13 DIAGNOSIS — I47.1: Primary | ICD-10-CM

## 2021-12-13 DIAGNOSIS — R65.10: ICD-10-CM

## 2021-12-13 DIAGNOSIS — D53.9: ICD-10-CM

## 2021-12-13 DIAGNOSIS — Z79.899: ICD-10-CM

## 2021-12-13 DIAGNOSIS — Z20.822: ICD-10-CM

## 2021-12-13 DIAGNOSIS — G93.41: ICD-10-CM

## 2021-12-13 DIAGNOSIS — Z79.4: ICD-10-CM

## 2021-12-13 DIAGNOSIS — I48.91: ICD-10-CM

## 2021-12-13 DIAGNOSIS — E87.1: ICD-10-CM

## 2021-12-13 DIAGNOSIS — I11.0: ICD-10-CM

## 2021-12-13 DIAGNOSIS — I50.9: ICD-10-CM

## 2021-12-13 DIAGNOSIS — E11.65: ICD-10-CM

## 2021-12-13 DIAGNOSIS — F03.90: ICD-10-CM

## 2021-12-13 LAB
ALBUMIN SERPL-MCNC: 3.2 GM/DL (ref 3.1–4.5)
ALP SERPL-CCNC: 58 U/L (ref 45–117)
ALT SERPL W P-5'-P-CCNC: 22 U/L (ref 12–78)
APTT PPP: 29.3 SECONDS (ref 20–32.1)
AST SERPL-CCNC: 19 IU/L (ref 3–35)
BASOPHILS # BLD AUTO: 0 10*3/UL (ref 0–0.1)
BASOPHILS NFR BLD AUTO: 0.1 % (ref 0–1)
BUN SERPL-MCNC: 17 MG/DL (ref 7–24)
CHLORIDE SERPL-SCNC: 101 MMOL/L (ref 98–107)
CREAT SERPL-MCNC: 1.11 MG/DL (ref 0.7–1.3)
EOSINOPHIL # BLD AUTO: 0 % (ref 1–4)
EOSINOPHIL # BLD AUTO: 0 10*3/UL (ref 0–0.4)
ERYTHROCYTE [DISTWIDTH] IN BLOOD BY AUTOMATED COUNT: 12.6 % (ref 0–14.5)
HCT VFR BLD AUTO: 36.3 % (ref 42–52)
INR BLD: 1 (ref 2–3.5)
LIPASE SERPL-CCNC: 82 U/L (ref 73–393)
LYMPHOCYTES # BLD AUTO: 1.1 10*3/UL (ref 1.3–4.4)
LYMPHOCYTES NFR BLD AUTO: 8.6 % (ref 27–41)
MCH RBC QN AUTO: 32.5 PG (ref 27–31)
MCHC RBC AUTO-ENTMCNC: 34.2 G/DL (ref 33–37)
MCV RBC AUTO: 95.3 FL (ref 80–94)
MONOCYTES # BLD AUTO: 1.2 10*3/UL (ref 0.1–1)
MONOCYTES NFR BLD MANUAL: 9.5 % (ref 3–9)
NEUT #: 10.3 10*3/UL (ref 2.3–7.9)
NEUT %: 81.3 % (ref 47–73)
NRBC BLD QL AUTO: 0 % (ref 0–0)
PLATELET # BLD AUTO: 163 10*3/UL (ref 130–400)
PMV BLD AUTO: 10 FL (ref 9.6–12.3)
POTASSIUM SERPL-SCNC: 4.1 MMOL/L (ref 3.5–5.1)
PROT SERPL-MCNC: 7.5 GM/DL (ref 6.4–8.2)
RBC # BLD AUTO: 3.81 10*6/UL (ref 4.5–5.9)
SODIUM SERPL-SCNC: 133 MMOL/L (ref 136–145)
WBC NRBC COR # BLD AUTO: 12.6 10*3/UL (ref 4.8–10.8)

## 2021-12-14 VITALS — DIASTOLIC BLOOD PRESSURE: 73 MMHG | SYSTOLIC BLOOD PRESSURE: 134 MMHG

## 2021-12-14 VITALS — DIASTOLIC BLOOD PRESSURE: 86 MMHG

## 2021-12-14 VITALS — SYSTOLIC BLOOD PRESSURE: 154 MMHG | DIASTOLIC BLOOD PRESSURE: 78 MMHG

## 2021-12-14 VITALS — SYSTOLIC BLOOD PRESSURE: 141 MMHG | DIASTOLIC BLOOD PRESSURE: 87 MMHG

## 2021-12-14 VITALS — SYSTOLIC BLOOD PRESSURE: 149 MMHG | DIASTOLIC BLOOD PRESSURE: 71 MMHG

## 2021-12-14 LAB
25(OH)D3 SERPL-MCNC: 19.5 NG/ML (ref 30–100)
ALBUMIN SERPL-MCNC: 3.1 GM/DL (ref 3.1–4.5)
ALP SERPL-CCNC: 57 U/L (ref 45–117)
ALT SERPL W P-5'-P-CCNC: 20 U/L (ref 12–78)
AST SERPL-CCNC: 20 IU/L (ref 3–35)
BASOPHILS # BLD AUTO: 0 10*3/UL (ref 0–0.1)
BASOPHILS NFR BLD AUTO: 0.1 % (ref 0–1)
BUN SERPL-MCNC: 13 MG/DL (ref 7–24)
CHLORIDE SERPL-SCNC: 103 MMOL/L (ref 98–107)
CHOLEST SERPL-MCNC: 123 MG/DL (ref ?–200)
CREAT SERPL-MCNC: 0.88 MG/DL (ref 0.7–1.3)
EOSINOPHIL # BLD AUTO: 0 10*3/UL (ref 0–0.4)
EOSINOPHIL # BLD AUTO: 0.2 % (ref 1–4)
EPI CELLS #/AREA URNS HPF: (no result) /[HPF]
ERYTHROCYTE [DISTWIDTH] IN BLOOD BY AUTOMATED COUNT: 12.5 % (ref 0–14.5)
GLUCOSE UR QL: (no result)
HCT VFR BLD AUTO: 36.2 % (ref 42–52)
LDLC SERPL DIRECT ASSAY-MCNC: 57 MG/DL (ref 9–159)
LYMPHOCYTES # BLD AUTO: 2.2 10*3/UL (ref 1.3–4.4)
LYMPHOCYTES NFR BLD AUTO: 18.7 % (ref 27–41)
MCH RBC QN AUTO: 32.5 PG (ref 27–31)
MCHC RBC AUTO-ENTMCNC: 34.3 G/DL (ref 33–37)
MCV RBC AUTO: 94.8 FL (ref 80–94)
MONOCYTES # BLD AUTO: 1.1 10*3/UL (ref 0.1–1)
MONOCYTES NFR BLD MANUAL: 9.2 % (ref 3–9)
MUCOUS THREADS URNS QL MICRO: (no result)
NEUT #: 8.4 10*3/UL (ref 2.3–7.9)
NEUT %: 71.5 % (ref 47–73)
NRBC BLD QL AUTO: 0 10*3/UL (ref 0–0)
PH UR STRIP: 5 [PH] (ref 4.5–8)
PLATELET # BLD AUTO: 159 10*3/UL (ref 130–400)
PMV BLD AUTO: 9.9 FL (ref 9.6–12.3)
POTASSIUM SERPL-SCNC: 3.7 MMOL/L (ref 3.5–5.1)
PROT SERPL-MCNC: 7.3 GM/DL (ref 6.4–8.2)
RBC # BLD AUTO: 3.82 10*6/UL (ref 4.5–5.9)
SODIUM SERPL-SCNC: 135 MMOL/L (ref 136–145)
SP GR UR: 1.02 (ref 1–1.03)
T4 FREE SERPL-MCNC: 0.98 NG/DL (ref 0.76–1.46)
TRIGL SERPL-MCNC: 107 MG/DL (ref ?–150)
TSH SERPL DL<=0.005 MIU/L-ACNC: 3.28 UIU/ML (ref 0.36–4.75)
UROBILINOGEN UR STRIP-MCNC: 1 E.U./DL (ref 0–1)
VITAMIN B12: 297 PG/ML (ref 247–911)
WBC NRBC COR # BLD AUTO: 11.8 10*3/UL (ref 4.8–10.8)

## 2021-12-15 VITALS — DIASTOLIC BLOOD PRESSURE: 85 MMHG

## 2021-12-15 VITALS — SYSTOLIC BLOOD PRESSURE: 150 MMHG | DIASTOLIC BLOOD PRESSURE: 85 MMHG

## 2021-12-15 VITALS — DIASTOLIC BLOOD PRESSURE: 89 MMHG

## 2021-12-15 LAB
BASOPHILS # BLD AUTO: 0 10*3/UL (ref 0–0.1)
BASOPHILS NFR BLD AUTO: 0.2 % (ref 0–1)
BUN SERPL-MCNC: 14 MG/DL (ref 7–24)
CHLORIDE SERPL-SCNC: 103 MMOL/L (ref 98–107)
CREAT SERPL-MCNC: 0.9 MG/DL (ref 0.7–1.3)
EOSINOPHIL # BLD AUTO: 0.1 10*3/UL (ref 0–0.4)
EOSINOPHIL # BLD AUTO: 0.7 % (ref 1–4)
ERYTHROCYTE [DISTWIDTH] IN BLOOD BY AUTOMATED COUNT: 12.5 % (ref 0–14.5)
HCT VFR BLD AUTO: 38.7 % (ref 42–52)
LYMPHOCYTES # BLD AUTO: 3.2 10*3/UL (ref 1.3–4.4)
LYMPHOCYTES NFR BLD AUTO: 30.4 % (ref 27–41)
MCH RBC QN AUTO: 32.3 PG (ref 27–31)
MCHC RBC AUTO-ENTMCNC: 33.6 G/DL (ref 33–37)
MCV RBC AUTO: 96 FL (ref 80–94)
MONOCYTES # BLD AUTO: 0.8 10*3/UL (ref 0.1–1)
MONOCYTES NFR BLD MANUAL: 7.5 % (ref 3–9)
NEUT #: 6.4 10*3/UL (ref 2.3–7.9)
NEUT %: 60.9 % (ref 47–73)
NRBC BLD QL AUTO: 0 % (ref 0–0)
PLATELET # BLD AUTO: 201 10*3/UL (ref 130–400)
PMV BLD AUTO: 10.5 FL (ref 9.6–12.3)
POTASSIUM SERPL-SCNC: 3.8 MMOL/L (ref 3.5–5.1)
RBC # BLD AUTO: 4.03 10*6/UL (ref 4.5–5.9)
SODIUM SERPL-SCNC: 136 MMOL/L (ref 136–145)
WBC NRBC COR # BLD AUTO: 10.5 10*3/UL (ref 4.8–10.8)

## 2022-02-08 PROBLEM — I10 BENIGN HYPERTENSION: Status: ACTIVE | Noted: 2019-06-05

## 2022-02-08 PROBLEM — E66.3 OVERWEIGHT: Status: ACTIVE | Noted: 2019-09-11

## 2022-02-08 PROBLEM — E78.2 MIXED HYPERLIPIDEMIA: Status: ACTIVE | Noted: 2019-06-05

## 2022-02-08 PROBLEM — E11.40 DIABETIC NEUROPATHY WITH NEUROLOGIC COMPLICATION (HCC): Status: ACTIVE | Noted: 2019-06-05

## 2022-02-08 PROBLEM — E66.9 OBESITY: Status: ACTIVE | Noted: 2019-06-05

## 2022-02-08 PROBLEM — E11.49 DIABETIC NEUROPATHY WITH NEUROLOGIC COMPLICATION (HCC): Status: ACTIVE | Noted: 2019-06-05

## 2022-02-09 ENCOUNTER — OFFICE VISIT (OUTPATIENT)
Dept: CARDIOLOGY CLINIC | Age: 87
End: 2022-02-09
Payer: MEDICARE

## 2022-02-09 VITALS
DIASTOLIC BLOOD PRESSURE: 62 MMHG | BODY MASS INDEX: 26.84 KG/M2 | HEIGHT: 67 IN | SYSTOLIC BLOOD PRESSURE: 126 MMHG | HEART RATE: 69 BPM | RESPIRATION RATE: 20 BRPM | WEIGHT: 171 LBS

## 2022-02-09 DIAGNOSIS — I10 BENIGN HYPERTENSION: Primary | ICD-10-CM

## 2022-02-09 PROCEDURE — 93000 ELECTROCARDIOGRAM COMPLETE: CPT | Performed by: INTERNAL MEDICINE

## 2022-02-09 PROCEDURE — 99213 OFFICE O/P EST LOW 20 MIN: CPT | Performed by: INTERNAL MEDICINE

## 2022-02-09 RX ORDER — ONDANSETRON 4 MG/1
1 TABLET, FILM COATED ORAL
COMMUNITY
Start: 2022-01-13

## 2022-02-09 RX ORDER — KETOCONAZOLE 20 MG/ML
SHAMPOO TOPICAL
COMMUNITY
Start: 2021-12-23

## 2022-02-09 RX ORDER — DILTIAZEM HYDROCHLORIDE 240 MG/1
1 CAPSULE, COATED, EXTENDED RELEASE ORAL EVERY MORNING
COMMUNITY
Start: 2022-01-13

## 2022-02-09 RX ORDER — ACETAMINOPHEN 120 MG/1
120 SUPPOSITORY RECTAL EVERY 4 HOURS PRN
COMMUNITY

## 2022-02-09 RX ORDER — DRONABINOL 2.5 MG/1
1 CAPSULE ORAL 2 TIMES DAILY
COMMUNITY
Start: 2022-02-04

## 2022-02-09 RX ORDER — FLUTICASONE PROPIONATE 50 MCG
1 SPRAY, SUSPENSION (ML) NASAL DAILY
COMMUNITY

## 2022-02-09 RX ORDER — ACETAMINOPHEN 325 MG/1
650 TABLET ORAL EVERY 4 HOURS PRN
COMMUNITY

## 2022-02-09 RX ORDER — BISACODYL 5 MG
5 TABLET, DELAYED RELEASE (ENTERIC COATED) ORAL DAILY PRN
COMMUNITY

## 2022-02-09 NOTE — PROGRESS NOTES
OUTPATIENT CARDIOLOGY FOLLOW-UP    Name: Allyson Yeung    Age: 80 y.o. Primary Care Physician: Vickki Olszewski, DO    Date of Service: 2/9/2022    Chief Complaint:   Chief Complaint   Patient presents with    Follow-Up from Hospital     Pt has no cardiac complaints, last OV 07/02/2020       Interim History:   Mr. Jame Morris is a 80-year-old  male with a history of abnormal echocardiogram, hypertension, AVNRT, status post ablation in 2015 with Dr. Todd Zhong, type 2 diabetes, hearing loss, hyperlipidemia and tinnitus presented for follow-up visit. He was seen in the office on 7/2/2020, since his last visit, he had a multiple hospitalizations and ER visits. He was evaluated for severe LVH secondary to possible hypertensive heart disease or infiltrative cardiomyopathy. Dr. Ousmane Stone evaluated the patient and reviewed echocardiogram and he felt echo features are not suggestive of amyloidosis. He was recommended to continue antihypertensive medications and follow-up in 6 months. He was recently hospitalized in December 2021 and was seen by Dr. Brett Tee as a consult on 12/14/2021 for tachycardia and was diagnosed with wide-complex tachycardia most likely from SVT tachycardia induced acute heart failure. He was initiated on Cardizem to 40 mg p.o. daily and Lopressor was discontinued. He was given one-time dose of Lasix. No further cardiac testing was done at the time. He is compliant with medications, as well as salt and fluid intake. He does not take any over-the-counter arthritis medications. No new cardiac complaints since last cardiology evaluation. He denies recent chest pain, SOB, palpitations, lightheadedness, dizziness, syncope, PND, or orthopnea. SR on EKG.     Review of Systems:   Cardiac: As per HPI  General: No fever, chills  Pulmonary: As per HPI  HEENT: No visual disturbances, difficult swallowing  GI: No nausea, vomiting  Endocrine: No thyroid disease or DM  Musculoskeletal: CHENG x 4, no (EXELON) 1.5 MG capsule Take 1 capsule by mouth 2 times daily       memantine (NAMENDA) 5 MG tablet Take 5 mg by mouth 2 times daily       docusate sodium (COLACE) 100 MG capsule Take 100 mg by mouth 2 times daily.  Vitamin D (CHOLECALCIFEROL) 1000 UNITS CAPS capsule Take 1,000 Units by mouth daily. No current facility-administered medications for this visit. Physical Exam:  Resp 20   Ht 5' 7\" (1.702 m)   Wt 171 lb (77.6 kg)   BMI 26.78 kg/m²   Wt Readings from Last 3 Encounters:   02/09/22 171 lb (77.6 kg)   07/02/20 198 lb (89.8 kg)   02/25/15 199 lb (90.3 kg)     Appearance: Awake, alert and oriented x 3, no acute respiratory distress  Skin: Intact, no rash  Head: Normocephalic, atraumatic  Eyes: EOMI, no conjunctival erythema  ENMT: No pharyngeal erythema, MMM, no rhinorrhea  Neck: Supple, no elevated JVP, no carotid bruits  Lungs: Clear to auscultation bilaterally. No wheezes, rales, or rhonchi.   Cardiac: Regular rate and rhythm, +S1S2, no murmurs apparent  Abdomen: Soft, nontender, +bowel sounds  Extremities: Moves all extremities x 4, no lower extremity edema  Neurologic: No focal motor deficits apparent, normal mood and affect, alert and oriented x 3  Peripheral Pulses: Intact posterior tibial pulses bilaterally    Laboratory Tests:  Lab Results   Component Value Date    CREATININE 0.83 11/07/2021    BUN 13 11/07/2021     11/07/2021    K 3.4 (L) 11/07/2021     11/07/2021    CO2 25 11/07/2021     Lab Results   Component Value Date    MG 2.0 01/16/2015     Lab Results   Component Value Date    WBC 8.6 01/16/2015    HGB 13.1 01/16/2015    HCT 38.1 01/16/2015    MCV 98.2 01/16/2015     01/16/2015     Lab Results   Component Value Date    ALT 21 01/16/2015    AST 38 01/16/2015    ALKPHOS 48 01/16/2015    BILITOT 1.0 01/16/2015     Lab Results   Component Value Date    TROPONINI <0.01 01/16/2015     Lab Results   Component Value Date    INR 1.4 01/16/2015    PROTIME 14.7 (H) 01/16/2015     Lab Results   Component Value Date    TSH 4.360 (H) 01/16/2015     No results found for: LABA1C  No results found for: EAG  No results found for: CHOL  No results found for: TRIG  No results found for: HDL  No results found for: LDLCALC, LDLCHOLESTEROL  No results found for: LABVLDL, VLDL  No results found for: CHOLHDLRATIO    Cardiac Tests:  ECG: Normal sinus rhythm, first-degree AV block, nonspecific interventricular conduction delay, left anterior fascicular block, nonspecific ST-T changes, but no abnormal EKG. No significant changes compared to prior EKG      Echocardiogram:   Transthoracic echo 2/2017 [facility]  Normal LV function with mild LVH, EF 65 to 70% with stage I diastolic dysfunction. RV size and function normal.  Valves not well visualized. Mild TR. Mild posterior pericardial effusion noted.     Transthoracic echo 5/2020, [facility]. LVEF is 50-55%.      There is normal LV segmental wall motion.       Severe concentric left ventricular hypertrophy.       The right ventricular systolic function is normal.       Trace mitral regurgitation.       Mild tricuspid regurgitation. Stress test:  NA      Cardiac catheterization: NA      EP study /AVNRT ablation 1/2015 Dr. Shelton Primus:   1.    Typical AV node reentry tachycardia.   2.    Successful RF ablation of typical AV node reentry tachycardia.   3.    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 The ASCVD Risk score (Marcelo Tovar, et al., 2013) failed to calculate for the following reasons: The 2013 ASCVD risk score is only valid for ages 36 to 78        ASSESSMENT:  · History of recurrent SVT status post ablation2015 with Dr. Won Coelho with breakthrough episodes. Now, controlled on Cardizem  · Hypertension well controlled on Cardizem, off lisinopril hydrochlorothiazide  · Severe hearing loss  · Type 2 diabetes on Metformin and Trulicity. · Hyperlipidemia  · Lifetime non-smoker.   · Mild dementia    Plan:   · Continue Cardizem  mg po daily for rate control. · Continue rest of the current medications   · Follow up with us in 6 months. The patient's current medication list, allergies, problem list and results of all previously ordered testing were reviewed at today's visit.   Eugene Allison MD  [facility]) Cardiology

## 2022-02-09 NOTE — PATIENT INSTRUCTIONS
· Continue Cardizem  mg po daily for rate control. · Continue rest of the current medications   · Follow up with us in 6 months.

## 2022-10-13 ENCOUNTER — OFFICE VISIT (OUTPATIENT)
Dept: CARDIOLOGY CLINIC | Age: 87
End: 2022-10-13
Payer: MEDICARE

## 2022-10-13 VITALS
HEIGHT: 67 IN | HEART RATE: 63 BPM | SYSTOLIC BLOOD PRESSURE: 132 MMHG | RESPIRATION RATE: 20 BRPM | DIASTOLIC BLOOD PRESSURE: 74 MMHG | BODY MASS INDEX: 27.78 KG/M2 | WEIGHT: 177 LBS

## 2022-10-13 DIAGNOSIS — I47.1 PSVT (PAROXYSMAL SUPRAVENTRICULAR TACHYCARDIA) (HCC): ICD-10-CM

## 2022-10-13 DIAGNOSIS — Z86.79 STATUS POST ABLATION OPERATION FOR ARRHYTHMIA: ICD-10-CM

## 2022-10-13 DIAGNOSIS — I44.1 AV BLOCK, MOBITZ 1: ICD-10-CM

## 2022-10-13 DIAGNOSIS — I10 BENIGN HYPERTENSION: Primary | ICD-10-CM

## 2022-10-13 DIAGNOSIS — Z98.890 STATUS POST ABLATION OPERATION FOR ARRHYTHMIA: ICD-10-CM

## 2022-10-13 DIAGNOSIS — I11.9 HYPERTENSIVE HEART DISEASE WITHOUT HEART FAILURE: ICD-10-CM

## 2022-10-13 PROBLEM — I51.7 CARDIOMEGALY: Status: ACTIVE | Noted: 2022-10-13

## 2022-10-13 PROBLEM — J30.2 SEASONAL ALLERGIES: Status: ACTIVE | Noted: 2022-10-13

## 2022-10-13 PROBLEM — H90.5 SENSORINEURAL HEARING LOSS: Status: ACTIVE | Noted: 2022-10-13

## 2022-10-13 PROBLEM — E03.9 HYPOTHYROIDISM: Status: ACTIVE | Noted: 2022-10-13

## 2022-10-13 PROBLEM — R39.11 URINARY HESITANCY: Status: ACTIVE | Noted: 2022-10-13

## 2022-10-13 PROBLEM — K59.00 CONSTIPATION: Status: ACTIVE | Noted: 2022-10-13

## 2022-10-13 PROBLEM — D69.6 THROMBOCYTOPENIA (HCC): Status: ACTIVE | Noted: 2022-10-13

## 2022-10-13 PROBLEM — F41.9 ANXIETY: Status: ACTIVE | Noted: 2022-10-13

## 2022-10-13 PROBLEM — K21.9 GASTROESOPHAGEAL REFLUX DISEASE: Status: ACTIVE | Noted: 2022-10-13

## 2022-10-13 PROBLEM — E55.9 VITAMIN D DEFICIENCY: Status: ACTIVE | Noted: 2022-10-13

## 2022-10-13 PROBLEM — E11.65 HYPERGLYCEMIA DUE TO TYPE 2 DIABETES MELLITUS (HCC): Status: ACTIVE | Noted: 2022-10-13

## 2022-10-13 PROBLEM — G62.9 NEUROPATHY: Status: ACTIVE | Noted: 2022-10-13

## 2022-10-13 PROBLEM — F32.9 MAJOR DEPRESSION, SINGLE EPISODE: Status: ACTIVE | Noted: 2022-10-13

## 2022-10-13 PROBLEM — H91.10 PRESBYCUSIS: Status: ACTIVE | Noted: 2022-10-13

## 2022-10-13 PROCEDURE — 1123F ACP DISCUSS/DSCN MKR DOCD: CPT | Performed by: INTERNAL MEDICINE

## 2022-10-13 PROCEDURE — 93000 ELECTROCARDIOGRAM COMPLETE: CPT | Performed by: INTERNAL MEDICINE

## 2022-10-13 PROCEDURE — 99214 OFFICE O/P EST MOD 30 MIN: CPT | Performed by: INTERNAL MEDICINE

## 2022-10-13 RX ORDER — HYDROXYZINE PAMOATE 25 MG/1
CAPSULE ORAL
COMMUNITY
Start: 2022-09-13

## 2022-10-13 RX ORDER — MIRTAZAPINE 15 MG/1
TABLET, FILM COATED ORAL
COMMUNITY
Start: 2022-09-26

## 2022-10-13 RX ORDER — INSULIN LISPRO 100 [IU]/ML
INJECTION, SOLUTION INTRAVENOUS; SUBCUTANEOUS
COMMUNITY
Start: 2022-08-29

## 2022-10-13 RX ORDER — RIVASTIGMINE 9.5 MG/24H
PATCH, EXTENDED RELEASE TRANSDERMAL
COMMUNITY
Start: 2022-10-03

## 2022-10-13 NOTE — PROGRESS NOTES
OUTPATIENT CARDIOLOGY FOLLOW-UP    Name: Yesica Mason    Age: 80 y.o. Primary Care Physician: Jessy Plascencia DO    Date of Service: 10/13/2022    Chief Complaint:   Chief Complaint   Patient presents with    Follow-up     Cardiac check up, no cardiac complaints, last OV 02/09/2022        Interim History:   Here for follow-up regarding SVT. Currently resides at [place] [id number] for the past several years, nonambulatory gets around in a wheelchair. Presents today with his daughter. Very hard of hearing but has no complaints. Denies chest pain shortness of breath or palpitations. Had an ablation in 2015. Had some admissions at the end of last year for recurrent SVT now well controlled with diltiazem.     Review of Systems:   Negative except as scribed above    Past Medical History:  Past Medical History:   Diagnosis Date    Arthritis     Cataract     Diabetes mellitus ([address]     Difficulty hearing     Hyperlipidemia     Hypertension     Hypothyroidism 10/13/2022    Palpitations     Ringing in ears        Past Surgical History:  Past Surgical History:   Procedure Laterality Date    ABLATION OF DYSRHYTHMIC FOCUS  1-    Dr. Cristhian Muñoz PROSTHESIS INSERTION  11/4/2009 and 12/9/2009    NOSE SURGERY      TOTAL KNEE ARTHROPLASTY Right 7/2008    TOTAL KNEE ARTHROPLASTY Left 6/2009       Family History:  Family History   Problem Relation Age of Onset    Heart Disease Mother         chf    Cancer Father 54        lung ca       Social History:  Social History     Tobacco Use    Smoking status: Never    Smokeless tobacco: Never   Vaping Use    Vaping Use: Never used   Substance Use Topics    Alcohol use: No    Drug use: No        Allergies:  No Known Allergies    Current Medications:    Current Outpatient Medications:     hydrOXYzine pamoate (VISTARIL) 25 MG capsule, , Disp: , Rfl:     insulin lispro, 1 Unit Dial, (HUMALOG/ADMELOG) 100 UNIT/ML SOPN, , Disp: , Rfl:     mirtazapine (REMERON) 15 MG tablet, , Disp: , Rfl:     rivastigmine (EXELON) 9.5 MG/24HR, , Disp: , Rfl:     dilTIAZem (CARDIZEM CD) 240 MG extended release capsule, Take 1 capsule by mouth every morning, Disp: , Rfl:     dronabinol (MARINOL) 2.5 MG capsule, Take 1 capsule by mouth 2 times daily. , Disp: , Rfl:     ketoconazole (NIZORAL) 2 % shampoo, , Disp: , Rfl:     metFORMIN (GLUCOPHAGE) 1000 MG tablet, Take 1 tablet by mouth 2 times daily, Disp: , Rfl:     ondansetron (ZOFRAN) 4 MG tablet, Take 1 tablet by mouth every 4-6 hours as needed, Disp: , Rfl:     acetaminophen (TYLENOL) 120 MG suppository, Place 120 mg rectally every 4 hours as needed for Fever, Disp: , Rfl:     bisacodyl (BISACODYL) 5 MG EC tablet, Take 5 mg by mouth daily as needed for Constipation, Disp: , Rfl:     fluticasone (FLONASE) 50 MCG/ACT nasal spray, 1 spray by Each Nostril route daily, Disp: , Rfl:     glucagon 1 MG injection, Inject 1 kit into the muscle once, Disp: , Rfl:     magnesium hydroxide (MILK OF MAGNESIA) 400 MG/5ML suspension, Take by mouth daily as needed for Constipation, Disp: , Rfl:     acetaminophen (TYLENOL) 325 MG tablet, Take 650 mg by mouth every 4 hours as needed for Pain, Disp: , Rfl:     TRULICITY 1.5 EB/2.4QI SOPN, Inject 1.5 Syringes into the skin once a week , Disp: , Rfl:     rivastigmine (EXELON) 1.5 MG capsule, Take 1 capsule by mouth 2 times daily , Disp: , Rfl:     memantine (NAMENDA) 5 MG tablet, Take 5 mg by mouth 2 times daily , Disp: , Rfl:     docusate sodium (COLACE) 100 MG capsule, Take 100 mg by mouth 2 times daily. , Disp: , Rfl:     Vitamin D (CHOLECALCIFEROL) 1000 UNITS CAPS capsule, Take 1,000 Units by mouth daily. , Disp: , Rfl:     Physical Exam:  /74   Pulse 63   Resp 20   Ht 5' 7\" (1.702 m)   Wt 177 lb (80.3 kg)   BMI 27.72 kg/m²   Wt Readings from Last 3 Encounters:   10/13/22 177 lb (80.3 kg)   02/09/22 171 lb (77.6 kg)   07/02/20 198 lb (89.8 kg)     Appearance: Frail-appearing early gentleman, awake, alert and oriented x 3, no acute respiratory distress, severely hard of hearing  Skin: Intact, no rash  Head: Normocephalic, atraumatic  Eyes: EOMI, no conjunctival erythema  ENMT: No pharyngeal erythema, MMM, no rhinorrhea  Neck: Supple, no elevated JVP, no carotid bruits  Lungs: Clear to auscultation bilaterally. No wheezes, rales, or rhonchi. Cardiac: Regular rate and rhythm, +S1S2, no murmurs apparent  Abdomen: Soft, nontender, +bowel sounds  Extremities: Moves all extremities x 4, no lower extremity edema  Neurologic: No focal motor deficits apparent, normal mood and affect, alert and oriented x 3  Peripheral Pulses: Intact posterior tibial pulses bilaterally    Laboratory Tests:  Lab Results   Component Value Date    CREATININE 0.90 09/28/2022    BUN 13 09/28/2022     09/28/2022    K 4.1 09/28/2022     09/28/2022    CO2 31 09/28/2022     Lab Results   Component Value Date/Time    MG 2.0 01/16/2015 10:06 AM     Lab Results   Component Value Date    WBC 6.8 09/28/2022    HGB 13.7 (L) 09/28/2022    HCT 40.3 (L) 09/28/2022    MCV 95.0 (H) 09/28/2022     09/28/2022     Lab Results   Component Value Date    ALT 30 09/28/2022    AST 25 09/28/2022    ALKPHOS 54 09/28/2022    BILITOT 0.5 09/28/2022     Lab Results   Component Value Date    TROPONINI <0.01 01/16/2015     Lab Results   Component Value Date    INR 1.4 01/16/2015    PROTIME 14.7 (H) 01/16/2015     Lab Results   Component Value Date    TSH 4.360 (H) 01/16/2015     No results found for: LABA1C  No results found for: EAG  No results found for: CHOL  No results found for: TRIG  No results found for: HDL  No results found for: LDLCALC, LDLCHOLESTEROL  No results found for: LABVLDL, VLDL  No results found for: CHOLHDLRATIO  No results for input(s): PROBNP in the last 72 hours. Cardiac Tests:  ECG:   Sinus rhythm likely secondary AV block Mobitz type I (Wenckebach) 60 bpm.  LAFB.   Poor R wave progression. IVCD QRS duration 126 ms. Echocardiogram:   TTE 2/2/21       <Conclusion>       TDS, Optison used to better visualize endocardial border. The left ventricle is normal size. Left ventricular systolic function is borderline normal, LV EF        50-55%. Moderate concentric left ventricular hypertrophy. Trace tricuspid regurgitation, RVSP 20mmHg. The aortic root is normal in size. Anteriro pericardial effusion vs fat pad. No intracardiac mass. Compared to prior study from 3/2020 - no significant canges noted. Stress test:      Cardiac catheterization:     EP study /AVNRT ablation 1/2015 Dr. Thierry Fierro:   1. Typical AV node reentry tachycardia. 2.    Successful RF ablation of typical AV node reentry tachycardia. 3.    Abnormal His Purkinje system function with HV interval of 69 msec    Orders Placed This Encounter   Procedures    EKG 12 lead        Requested Prescriptions      No prescriptions requested or ordered in this encounter        ASSESSMENT / PLAN:  Recurrent SVT s/p AVNRT ablation 2015. Currently well controlled, maintained on diltiazem  Second-degree AV block Mobitz type 1, asymptomatic  Hypertensive heart disease/LVH  Hypertension  Type 2 diabetes  Hyperlipidemia  Severe hypoacusis  Nonambulatory/wheelchair-bound/frailty    Recommendations:  Stable from cardiac standpoint. Continue diltiazem 240 mg daily for arrhythmia suppression  Continue current cardiac medications otherwise  Risk factor modification  Follow-up in 1 year or sooner if need arises    Discussed with patient and daughter    The patient's current medication list, allergies, problem list and results of all previously ordered testing were reviewed at today's visit.     Johny Manzano MD, [address] [facility]

## 2022-10-26 ENCOUNTER — HOSPITAL ENCOUNTER (INPATIENT)
Dept: HOSPITAL 83 - 4E | Age: 87
LOS: 1 days | DRG: 71 | End: 2022-10-27
Attending: INTERNAL MEDICINE | Admitting: INTERNAL MEDICINE
Payer: COMMERCIAL

## 2022-10-26 VITALS — HEIGHT: 70 IN | BODY MASS INDEX: 24.91 KG/M2 | WEIGHT: 174 LBS

## 2022-10-26 VITALS — DIASTOLIC BLOOD PRESSURE: 95 MMHG

## 2022-10-26 VITALS — DIASTOLIC BLOOD PRESSURE: 89 MMHG

## 2022-10-26 VITALS — DIASTOLIC BLOOD PRESSURE: 82 MMHG

## 2022-10-26 DIAGNOSIS — Z51.5: ICD-10-CM

## 2022-10-26 DIAGNOSIS — Z66: ICD-10-CM

## 2022-10-26 DIAGNOSIS — F03.90: ICD-10-CM

## 2022-10-26 DIAGNOSIS — I48.91: ICD-10-CM

## 2022-10-26 DIAGNOSIS — E87.20: ICD-10-CM

## 2022-10-26 DIAGNOSIS — E11.65: ICD-10-CM

## 2022-10-26 DIAGNOSIS — G93.41: Primary | ICD-10-CM

## 2022-10-26 DIAGNOSIS — I10: ICD-10-CM

## 2022-10-27 VITALS — DIASTOLIC BLOOD PRESSURE: 90 MMHG | SYSTOLIC BLOOD PRESSURE: 134 MMHG
